# Patient Record
Sex: FEMALE | Race: WHITE | NOT HISPANIC OR LATINO | Employment: OTHER | URBAN - METROPOLITAN AREA
[De-identification: names, ages, dates, MRNs, and addresses within clinical notes are randomized per-mention and may not be internally consistent; named-entity substitution may affect disease eponyms.]

---

## 2024-08-29 ENCOUNTER — APPOINTMENT (OUTPATIENT)
Dept: RADIOLOGY | Facility: HOSPITAL | Age: 74
DRG: 375 | End: 2024-08-29
Payer: MEDICARE

## 2024-08-29 ENCOUNTER — APPOINTMENT (OUTPATIENT)
Dept: VASCULAR MEDICINE | Facility: HOSPITAL | Age: 74
DRG: 375 | End: 2024-08-29
Payer: MEDICARE

## 2024-08-29 ENCOUNTER — APPOINTMENT (OUTPATIENT)
Dept: CARDIOLOGY | Facility: HOSPITAL | Age: 74
DRG: 375 | End: 2024-08-29
Payer: MEDICARE

## 2024-08-29 ENCOUNTER — HOSPITAL ENCOUNTER (INPATIENT)
Facility: HOSPITAL | Age: 74
DRG: 375 | End: 2024-08-29
Attending: EMERGENCY MEDICINE | Admitting: INTERNAL MEDICINE
Payer: MEDICARE

## 2024-08-29 DIAGNOSIS — R60.0 LOCALIZED EDEMA: ICD-10-CM

## 2024-08-29 DIAGNOSIS — E87.1 HYPONATREMIA: Primary | ICD-10-CM

## 2024-08-29 DIAGNOSIS — R52 PAIN: ICD-10-CM

## 2024-08-29 DIAGNOSIS — C18.9 MALIGNANT NEOPLASM OF COLON, UNSPECIFIED PART OF COLON (MULTI): ICD-10-CM

## 2024-08-29 LAB
ALBUMIN SERPL BCP-MCNC: 3 G/DL (ref 3.4–5)
ALP SERPL-CCNC: 92 U/L (ref 33–136)
ALT SERPL W P-5'-P-CCNC: 3 U/L (ref 7–45)
ANION GAP SERPL CALC-SCNC: 14 MMOL/L (ref 10–20)
AST SERPL W P-5'-P-CCNC: 8 U/L (ref 9–39)
BASOPHILS # BLD AUTO: 0.01 X10*3/UL (ref 0–0.1)
BASOPHILS NFR BLD AUTO: 0.1 %
BILIRUB SERPL-MCNC: 0.8 MG/DL (ref 0–1.2)
BUN SERPL-MCNC: 19 MG/DL (ref 6–23)
CALCIUM SERPL-MCNC: 9.1 MG/DL (ref 8.6–10.3)
CHLORIDE SERPL-SCNC: 93 MMOL/L (ref 98–107)
CO2 SERPL-SCNC: 25 MMOL/L (ref 21–32)
CREAT SERPL-MCNC: 0.95 MG/DL (ref 0.5–1.05)
EGFRCR SERPLBLD CKD-EPI 2021: 63 ML/MIN/1.73M*2
EOSINOPHIL # BLD AUTO: 0.27 X10*3/UL (ref 0–0.4)
EOSINOPHIL NFR BLD AUTO: 2.6 %
ERYTHROCYTE [DISTWIDTH] IN BLOOD BY AUTOMATED COUNT: 13.9 % (ref 11.5–14.5)
GLUCOSE BLD MANUAL STRIP-MCNC: 130 MG/DL (ref 74–99)
GLUCOSE BLD MANUAL STRIP-MCNC: 165 MG/DL (ref 74–99)
GLUCOSE SERPL-MCNC: 184 MG/DL (ref 74–99)
HCT VFR BLD AUTO: 30.4 % (ref 36–46)
HGB BLD-MCNC: 9.6 G/DL (ref 12–16)
IMM GRANULOCYTES # BLD AUTO: 0.05 X10*3/UL (ref 0–0.5)
IMM GRANULOCYTES NFR BLD AUTO: 0.5 % (ref 0–0.9)
LACTATE SERPL-SCNC: 1.2 MMOL/L (ref 0.4–2)
LYMPHOCYTES # BLD AUTO: 0.71 X10*3/UL (ref 0.8–3)
LYMPHOCYTES NFR BLD AUTO: 7 %
MAGNESIUM SERPL-MCNC: 1.39 MG/DL (ref 1.6–2.4)
MCH RBC QN AUTO: 27.3 PG (ref 26–34)
MCHC RBC AUTO-ENTMCNC: 31.6 G/DL (ref 32–36)
MCV RBC AUTO: 86 FL (ref 80–100)
MONOCYTES # BLD AUTO: 0.56 X10*3/UL (ref 0.05–0.8)
MONOCYTES NFR BLD AUTO: 5.5 %
NEUTROPHILS # BLD AUTO: 8.59 X10*3/UL (ref 1.6–5.5)
NEUTROPHILS NFR BLD AUTO: 84.3 %
NRBC BLD-RTO: 0 /100 WBCS (ref 0–0)
PLATELET # BLD AUTO: 194 X10*3/UL (ref 150–450)
POTASSIUM SERPL-SCNC: 4.1 MMOL/L (ref 3.5–5.3)
PROT SERPL-MCNC: 6.3 G/DL (ref 6.4–8.2)
RBC # BLD AUTO: 3.52 X10*6/UL (ref 4–5.2)
SARS-COV-2 RNA RESP QL NAA+PROBE: NOT DETECTED
SODIUM SERPL-SCNC: 128 MMOL/L (ref 136–145)
WBC # BLD AUTO: 10.2 X10*3/UL (ref 4.4–11.3)

## 2024-08-29 PROCEDURE — 2500000004 HC RX 250 GENERAL PHARMACY W/ HCPCS (ALT 636 FOR OP/ED)

## 2024-08-29 PROCEDURE — 71046 X-RAY EXAM CHEST 2 VIEWS: CPT

## 2024-08-29 PROCEDURE — 96361 HYDRATE IV INFUSION ADD-ON: CPT

## 2024-08-29 PROCEDURE — 74176 CT ABD & PELVIS W/O CONTRAST: CPT

## 2024-08-29 PROCEDURE — 83605 ASSAY OF LACTIC ACID: CPT

## 2024-08-29 PROCEDURE — 71046 X-RAY EXAM CHEST 2 VIEWS: CPT | Mod: FOREIGN READ | Performed by: RADIOLOGY

## 2024-08-29 PROCEDURE — 96366 THER/PROPH/DIAG IV INF ADDON: CPT

## 2024-08-29 PROCEDURE — 70450 CT HEAD/BRAIN W/O DYE: CPT

## 2024-08-29 PROCEDURE — 83735 ASSAY OF MAGNESIUM: CPT

## 2024-08-29 PROCEDURE — 99285 EMERGENCY DEPT VISIT HI MDM: CPT | Mod: 25

## 2024-08-29 PROCEDURE — 36415 COLL VENOUS BLD VENIPUNCTURE: CPT

## 2024-08-29 PROCEDURE — 82947 ASSAY GLUCOSE BLOOD QUANT: CPT

## 2024-08-29 PROCEDURE — 93005 ELECTROCARDIOGRAM TRACING: CPT

## 2024-08-29 PROCEDURE — 1100000001 HC PRIVATE ROOM DAILY

## 2024-08-29 PROCEDURE — 85025 COMPLETE CBC W/AUTO DIFF WBC: CPT

## 2024-08-29 PROCEDURE — 70450 CT HEAD/BRAIN W/O DYE: CPT | Performed by: RADIOLOGY

## 2024-08-29 PROCEDURE — 93970 EXTREMITY STUDY: CPT

## 2024-08-29 PROCEDURE — 71250 CT THORAX DX C-: CPT | Mod: FOREIGN READ | Performed by: RADIOLOGY

## 2024-08-29 PROCEDURE — 87635 SARS-COV-2 COVID-19 AMP PRB: CPT

## 2024-08-29 PROCEDURE — 74176 CT ABD & PELVIS W/O CONTRAST: CPT | Mod: FOREIGN READ | Performed by: RADIOLOGY

## 2024-08-29 PROCEDURE — 80053 COMPREHEN METABOLIC PANEL: CPT

## 2024-08-29 PROCEDURE — 93970 EXTREMITY STUDY: CPT | Performed by: INTERNAL MEDICINE

## 2024-08-29 PROCEDURE — 96365 THER/PROPH/DIAG IV INF INIT: CPT | Mod: 59

## 2024-08-29 RX ORDER — ACETAMINOPHEN 325 MG/1
650 TABLET ORAL EVERY 4 HOURS PRN
Status: DISCONTINUED | OUTPATIENT
Start: 2024-08-29 | End: 2024-09-04 | Stop reason: HOSPADM

## 2024-08-29 RX ORDER — SODIUM CHLORIDE 9 MG/ML
100 INJECTION, SOLUTION INTRAVENOUS CONTINUOUS
Status: ACTIVE | OUTPATIENT
Start: 2024-08-29 | End: 2024-08-30

## 2024-08-29 RX ORDER — DEXTROSE 50 % IN WATER (D50W) INTRAVENOUS SYRINGE
12.5
Status: DISCONTINUED | OUTPATIENT
Start: 2024-08-29 | End: 2024-09-04 | Stop reason: HOSPADM

## 2024-08-29 RX ORDER — MORPHINE SULFATE 2 MG/ML
2 INJECTION, SOLUTION INTRAMUSCULAR; INTRAVENOUS EVERY 4 HOURS PRN
Status: DISCONTINUED | OUTPATIENT
Start: 2024-08-29 | End: 2024-09-04 | Stop reason: HOSPADM

## 2024-08-29 RX ORDER — DEXTROSE 50 % IN WATER (D50W) INTRAVENOUS SYRINGE
25
Status: DISCONTINUED | OUTPATIENT
Start: 2024-08-29 | End: 2024-09-04 | Stop reason: HOSPADM

## 2024-08-29 RX ORDER — MAGNESIUM SULFATE HEPTAHYDRATE 40 MG/ML
2 INJECTION, SOLUTION INTRAVENOUS ONCE
Status: COMPLETED | OUTPATIENT
Start: 2024-08-29 | End: 2024-08-29

## 2024-08-29 RX ORDER — INSULIN LISPRO 100 [IU]/ML
0-5 INJECTION, SOLUTION INTRAVENOUS; SUBCUTANEOUS
Status: DISCONTINUED | OUTPATIENT
Start: 2024-08-29 | End: 2024-09-04 | Stop reason: HOSPADM

## 2024-08-29 RX ORDER — ENOXAPARIN SODIUM 100 MG/ML
40 INJECTION SUBCUTANEOUS EVERY 24 HOURS
Status: DISCONTINUED | OUTPATIENT
Start: 2024-08-29 | End: 2024-09-04 | Stop reason: HOSPADM

## 2024-08-29 SDOH — SOCIAL STABILITY: SOCIAL INSECURITY: WITHIN THE LAST YEAR, HAVE YOU BEEN HUMILIATED OR EMOTIONALLY ABUSED IN OTHER WAYS BY YOUR PARTNER OR EX-PARTNER?: NO

## 2024-08-29 SDOH — ECONOMIC STABILITY: FOOD INSECURITY: WITHIN THE PAST 12 MONTHS, YOU WORRIED THAT YOUR FOOD WOULD RUN OUT BEFORE YOU GOT MONEY TO BUY MORE.: NEVER TRUE

## 2024-08-29 SDOH — SOCIAL STABILITY: SOCIAL INSECURITY: ARE THERE ANY APPARENT SIGNS OF INJURIES/BEHAVIORS THAT COULD BE RELATED TO ABUSE/NEGLECT?: NO

## 2024-08-29 SDOH — SOCIAL STABILITY: SOCIAL INSECURITY: HAVE YOU HAD ANY THOUGHTS OF HARMING ANYONE ELSE?: NO

## 2024-08-29 SDOH — ECONOMIC STABILITY: INCOME INSECURITY: IN THE PAST 12 MONTHS, HAS THE ELECTRIC, GAS, OIL, OR WATER COMPANY THREATENED TO SHUT OFF SERVICE IN YOUR HOME?: NO

## 2024-08-29 SDOH — SOCIAL STABILITY: SOCIAL INSECURITY
WITHIN THE LAST YEAR, HAVE YOU BEEN KICKED, HIT, SLAPPED, OR OTHERWISE PHYSICALLY HURT BY YOUR PARTNER OR EX-PARTNER?: NO

## 2024-08-29 SDOH — SOCIAL STABILITY: SOCIAL INSECURITY: ABUSE: ADULT

## 2024-08-29 SDOH — SOCIAL STABILITY: SOCIAL INSECURITY: HAS ANYONE EVER THREATENED TO HURT YOUR FAMILY OR YOUR PETS?: NO

## 2024-08-29 SDOH — SOCIAL STABILITY: SOCIAL INSECURITY
WITHIN THE LAST YEAR, HAVE TO BEEN RAPED OR FORCED TO HAVE ANY KIND OF SEXUAL ACTIVITY BY YOUR PARTNER OR EX-PARTNER?: NO

## 2024-08-29 SDOH — SOCIAL STABILITY: SOCIAL INSECURITY: DOES ANYONE TRY TO KEEP YOU FROM HAVING/CONTACTING OTHER FRIENDS OR DOING THINGS OUTSIDE YOUR HOME?: NO

## 2024-08-29 SDOH — SOCIAL STABILITY: SOCIAL NETWORK: HOW OFTEN DO YOU GET TOGETHER WITH FRIENDS OR RELATIVES?: ONCE A WEEK

## 2024-08-29 SDOH — ECONOMIC STABILITY: FOOD INSECURITY: WITHIN THE PAST 12 MONTHS, THE FOOD YOU BOUGHT JUST DIDN'T LAST AND YOU DIDN'T HAVE MONEY TO GET MORE.: NEVER TRUE

## 2024-08-29 SDOH — HEALTH STABILITY: MENTAL HEALTH: EXPERIENCED ANY OF THE FOLLOWING LIFE EVENTS: DEATH OF FAMILY/FRIEND

## 2024-08-29 SDOH — SOCIAL STABILITY: SOCIAL NETWORK: IN A TYPICAL WEEK, HOW MANY TIMES DO YOU TALK ON THE PHONE WITH FAMILY, FRIENDS, OR NEIGHBORS?: ONCE A WEEK

## 2024-08-29 SDOH — SOCIAL STABILITY: SOCIAL NETWORK: HOW OFTEN DO YOU ATTENT MEETINGS OF THE CLUB OR ORGANIZATION YOU BELONG TO?: NEVER

## 2024-08-29 SDOH — SOCIAL STABILITY: SOCIAL NETWORK
DO YOU BELONG TO ANY CLUBS OR ORGANIZATIONS SUCH AS CHURCH GROUPS UNIONS, FRATERNAL OR ATHLETIC GROUPS, OR SCHOOL GROUPS?: NO

## 2024-08-29 SDOH — SOCIAL STABILITY: SOCIAL INSECURITY: DO YOU FEEL ANYONE HAS EXPLOITED OR TAKEN ADVANTAGE OF YOU FINANCIALLY OR OF YOUR PERSONAL PROPERTY?: NO

## 2024-08-29 SDOH — HEALTH STABILITY: MENTAL HEALTH
HOW OFTEN DO YOU NEED TO HAVE SOMEONE HELP YOU WHEN YOU READ INSTRUCTIONS, PAMPHLETS, OR OTHER WRITTEN MATERIAL FROM YOUR DOCTOR OR PHARMACY?: NEVER

## 2024-08-29 SDOH — SOCIAL STABILITY: SOCIAL INSECURITY: WERE YOU ABLE TO COMPLETE ALL THE BEHAVIORAL HEALTH SCREENINGS?: YES

## 2024-08-29 SDOH — SOCIAL STABILITY: SOCIAL INSECURITY: ARE YOU OR HAVE YOU BEEN THREATENED OR ABUSED PHYSICALLY, EMOTIONALLY, OR SEXUALLY BY ANYONE?: NO

## 2024-08-29 SDOH — SOCIAL STABILITY: SOCIAL INSECURITY: WITHIN THE LAST YEAR, HAVE YOU BEEN AFRAID OF YOUR PARTNER OR EX-PARTNER?: NO

## 2024-08-29 SDOH — SOCIAL STABILITY: SOCIAL INSECURITY: DO YOU FEEL UNSAFE GOING BACK TO THE PLACE WHERE YOU ARE LIVING?: NO

## 2024-08-29 SDOH — SOCIAL STABILITY: SOCIAL NETWORK: ARE YOU MARRIED, WIDOWED, DIVORCED, SEPARATED, NEVER MARRIED, OR LIVING WITH A PARTNER?: MARRIED

## 2024-08-29 SDOH — HEALTH STABILITY: PHYSICAL HEALTH: ON AVERAGE, HOW MANY DAYS PER WEEK DO YOU ENGAGE IN MODERATE TO STRENUOUS EXERCISE (LIKE A BRISK WALK)?: 0 DAYS

## 2024-08-29 SDOH — SOCIAL STABILITY: SOCIAL INSECURITY: HAVE YOU HAD THOUGHTS OF HARMING ANYONE ELSE?: NO

## 2024-08-29 SDOH — HEALTH STABILITY: PHYSICAL HEALTH: ON AVERAGE, HOW MANY MINUTES DO YOU ENGAGE IN EXERCISE AT THIS LEVEL?: 0 MIN

## 2024-08-29 SDOH — HEALTH STABILITY: MENTAL HEALTH
STRESS IS WHEN SOMEONE FEELS TENSE, NERVOUS, ANXIOUS, OR CAN'T SLEEP AT NIGHT BECAUSE THEIR MIND IS TROUBLED. HOW STRESSED ARE YOU?: ONLY A LITTLE

## 2024-08-29 SDOH — SOCIAL STABILITY: SOCIAL NETWORK: HOW OFTEN DO YOU ATTEND CHURCH OR RELIGIOUS SERVICES?: NEVER

## 2024-08-29 ASSESSMENT — PATIENT HEALTH QUESTIONNAIRE - PHQ9
SUM OF ALL RESPONSES TO PHQ9 QUESTIONS 1 & 2: 0
1. LITTLE INTEREST OR PLEASURE IN DOING THINGS: NOT AT ALL
2. FEELING DOWN, DEPRESSED OR HOPELESS: NOT AT ALL
2. FEELING DOWN, DEPRESSED OR HOPELESS: NOT AT ALL
SUM OF ALL RESPONSES TO PHQ9 QUESTIONS 1 & 2: 0
1. LITTLE INTEREST OR PLEASURE IN DOING THINGS: NOT AT ALL

## 2024-08-29 ASSESSMENT — ACTIVITIES OF DAILY LIVING (ADL)
DRESSING YOURSELF: INDEPENDENT
JUDGMENT_ADEQUATE_SAFELY_COMPLETE_DAILY_ACTIVITIES: NO
HEARING - RIGHT EAR: DIFFICULTY WITH NOISE
GROOMING: INDEPENDENT
LACK_OF_TRANSPORTATION: NO
FEEDING YOURSELF: INDEPENDENT
BATHING: INDEPENDENT
PATIENT'S MEMORY ADEQUATE TO SAFELY COMPLETE DAILY ACTIVITIES?: YES
HEARING - LEFT EAR: DIFFICULTY WITH NOISE
ADEQUATE_TO_COMPLETE_ADL: NO
TOILETING: NEEDS ASSISTANCE
WALKS IN HOME: UNABLE TO ASSESS

## 2024-08-29 ASSESSMENT — COLUMBIA-SUICIDE SEVERITY RATING SCALE - C-SSRS
2. HAVE YOU ACTUALLY HAD ANY THOUGHTS OF KILLING YOURSELF?: NO
1. IN THE PAST MONTH, HAVE YOU WISHED YOU WERE DEAD OR WISHED YOU COULD GO TO SLEEP AND NOT WAKE UP?: NO
6. HAVE YOU EVER DONE ANYTHING, STARTED TO DO ANYTHING, OR PREPARED TO DO ANYTHING TO END YOUR LIFE?: NO

## 2024-08-29 ASSESSMENT — PAIN - FUNCTIONAL ASSESSMENT: PAIN_FUNCTIONAL_ASSESSMENT: 0-10

## 2024-08-29 ASSESSMENT — LIFESTYLE VARIABLES
SUBSTANCE_ABUSE_PAST_12_MONTHS: NO
SUBSTANCE_ABUSE_PAST_12_MONTHS: NO
HOW OFTEN DO YOU HAVE A DRINK CONTAINING ALCOHOL: NEVER
SUBSTANCE_ABUSE_PAST_12_MONTHS: NO
HOW OFTEN DO YOU HAVE 6 OR MORE DRINKS ON ONE OCCASION: NEVER
AUDIT-C TOTAL SCORE: 0
HOW OFTEN DO YOU HAVE A DRINK CONTAINING ALCOHOL: NEVER
AUDIT-C TOTAL SCORE: 0
PRESCIPTION_ABUSE_PAST_12_MONTHS: NO
HOW OFTEN DO YOU HAVE 6 OR MORE DRINKS ON ONE OCCASION: NEVER
PRESCIPTION_ABUSE_PAST_12_MONTHS: NO
SKIP TO QUESTIONS 9-10: 1
AUDIT-C TOTAL SCORE: 0
PRESCIPTION_ABUSE_PAST_12_MONTHS: NO
SKIP TO QUESTIONS 9-10: 1
AUDIT-C TOTAL SCORE: 0
HOW MANY STANDARD DRINKS CONTAINING ALCOHOL DO YOU HAVE ON A TYPICAL DAY: PATIENT DOES NOT DRINK
HOW MANY STANDARD DRINKS CONTAINING ALCOHOL DO YOU HAVE ON A TYPICAL DAY: PATIENT DOES NOT DRINK

## 2024-08-29 ASSESSMENT — PAIN SCALES - GENERAL: PAINLEVEL_OUTOF10: 0 - NO PAIN

## 2024-08-29 ASSESSMENT — COGNITIVE AND FUNCTIONAL STATUS - GENERAL: PATIENT BASELINE BEDBOUND: YES

## 2024-08-29 NOTE — PROGRESS NOTES
Pharmacy Medication History Review    Mehnaz Nunez is a 74 y.o. female admitted for Hyponatremia. Pharmacy reviewed the patient's [id number] medications and allergies for accuracy.    The list below reflectives the updated PTA list. Please review each medication in order reconciliation for additional clarification and justification.  Prior to Admission medications    No Medications        The list below reflectives the updated allergy list. Please review each documented allergy for additional clarification and justification.  Allergies  Reviewed by Candice Roach RN on 8/29/2024   No Known Allergies         Below are additional concerns with the patient's PTA list.      Svetlana Escalera

## 2024-08-29 NOTE — ASSESSMENT & PLAN NOTE
History of colon cancer with metastasis to lungs  Generalized weakness  Decreased appetite  History of dementia  -Oncology and palliative care/hospice consult and recommendations appreciated [facility]  -Imaging results as above, CT head added on to imaging due to confusion, hyponatremia, possibility of metastasis to brain  -Pain medication as needed  -IV fluids  -Dietitian recommendations   -SW and TCC eval and treat, possible placement    Diarrhea  -Pt  and best friend at bedside report patient has been having diarrhea x 1 week and fever yesterday, does endorse recent antibiotic use  -Stool pathogen and C diff PCR ordered    Hyponatremia  Hypochloremia  Hypomagnesemia  -IV fluids, patient's  endorses poor oral intake by patient x 3 days- likely due to dehydration  -Magnesium replaced in the ED  -Monitor with BMP in the a.m.    Anemia  -Hemoglobin 9.6 today (7.8 on 10/5/2021)  -Patient denies any signs of overt bleeding. Monitor for signs of bleeding, monitor with CBC in a.m.    Hypoalbuminemia  -Dietitian consult and recommendations appreciated  -Wound prevention    Hypertension  Hyperlipidemia  T2DM  -SSI  -Hypoglycemic protocol  -Diabetic diet    DVT prophylaxis  -Lovenox  -SCDs   160.02

## 2024-08-29 NOTE — H&P
History Of Present Illness  Mehnaz Nunez is a 74 y.o. female with a past medical history of hypertension, hyperlipidemia, T2DM, colon cancer (dx 3/21) with metastasis to lungs s/p chemo (reported last chemo in August of 2023), and dementia who presents to the emergency department for weakness.   and best friend at the bedside assisting with history.  Patient can tell me her name and where she is, but she does not know what month it is.  She does not understand why she is in the emergency department, and repeatedly states she would like to go home.  He states she has been having a decreased appetite for the past 3 days, accompanied by decreased movement and weakness.  He states she has had no food or water, has become so weak that she cannot stand.  She uses a walker usually to get around, but has not been able to use it lately.  Her best friend at the bedside is concerned because patient's  is unable to lift the patient.  They also report the patient has not been talking much.  He endorses that patient has had diarrhea (mixture of soft stools and liquid stools) for the past 1 week, and states she had a fever yesterday of 100.2 degrees.  He does state that she was recently on an antibiotic for suspected bronchitis a couple weeks ago.  Also endorses a dry cough.  Patient otherwise denies chills, headache, dizziness, chest pain, shortness of breath, nausea/vomiting, urinary symptoms, numbness/tingling, slurred speech, unilateral weakness.  Has been states patient is not taking any medications at the moment.  Patient's best friend at the bedside states that patient is not following with oncologist anymore, is just following with Dr. Méndez.  I have a discussion with patient, patient's , patient's best friend regarding CODE STATUS.  They are all in agreement that patient is to be DNR CC.  Patient's best friend is requesting that she be also called with any updates.  Her phone number is  [phone].    ED course: On arrival, patient's blood pressure 113/58, heart rate 87, respirations 15, afebrile, saturating 99% on room air.  Labs and imaging performed, revealing sodium 128, glucose 184, magnesium 1.39, lactate 1.2, no leukocytosis, anemia with hemoglobin 9.6 (7.8 on 10/5/2021).  COVID-19 swab negative.  CXR shows multiple bilateral large lung masses/nodules noted, consistent with metastatic disease.  No pleural effusion or pneumothorax.  CT chest/abdomen/pelvis reveals moderate thickening and nodularity distal rectosigmoid colon, suggesting the primary tumor, moderate proximal stool burden.  Patient given LR bolus 1 L and magnesium replacement in the ED.  Urinalysis pending.  Patient to be admitted under Dr. Cuevas.     Past Medical History  Past Medical History:   Diagnosis Date    Abnormal findings on diagnostic imaging of other specified body structures     Abnormal CT scan    Personal history of malignant neoplasm, unspecified     History of malignant neoplasm       Surgical History  Past Surgical History:   Procedure Laterality Date    OTHER SURGICAL HISTORY  02/25/2021    Incisional biopsy of breast    OTHER SURGICAL HISTORY  02/25/2021    Tonsillectomy    OTHER SURGICAL HISTORY  02/25/2021    Hysterectomy        Social History  She has no history on file for tobacco use, alcohol use, and drug use.    Family History  No family history on file.     Allergies  Patient has no known allergies.    Review of Systems     Physical Exam  Constitutional:       General: She is not in acute distress.     Appearance: She is not ill-appearing or toxic-appearing.      Comments: Patient is able to tell me her name and where she is, but she does not know what month it is.  She appears confused, does not know why she is in the emergency department.  She asked to go home repeatedly.   HENT:      Head: Normocephalic and atraumatic.      Nose: Nose normal.      Mouth/Throat:      Mouth: Mucous membranes are dry.    Eyes:      Extraocular Movements: Extraocular movements intact.      Conjunctiva/sclera: Conjunctivae normal.      Pupils: Pupils are equal, round, and reactive to light.   Cardiovascular:      Rate and Rhythm: Normal rate and regular rhythm.      Comments: Weak pulses in BLE.  Pulmonary:      Effort: Pulmonary effort is normal.      Comments: Coarse breath sounds  Abdominal:      General: Abdomen is flat.      Palpations: Abdomen is soft.      Tenderness: There is no abdominal tenderness.   Musculoskeletal:         General: Normal range of motion.      Cervical back: Normal range of motion.      Right lower leg: Edema (1+ pitting pedal edema) present.      Left lower leg: Edema (1+ pitting edema) present.   Skin:     General: Skin is warm and dry.      Coloration: Skin is not jaundiced.      Findings: No erythema.   Neurological:      Mental Status: She is disoriented.      Motor: Weakness (5/5 UE strength. 5/5 L sided LE strength, 2/5 R sided LE strength) present.          Last Recorded Vitals  Blood pressure 85/52, pulse 72, temperature 36.9 °C (98.4 °F), resp. rate (!) 24, SpO2 96%.    Relevant Results    Scheduled medications  enoxaparin, 40 mg, subcutaneous, q24h  insulin lispro, 0-5 Units, subcutaneous, TID      Continuous medications  sodium chloride 0.9%, 100 mL/hr      PRN medications  PRN medications: acetaminophen, morphine    Results for orders placed or performed during the hospital encounter of 08/29/24 (from the past 24 hour(s))   CBC and Auto Differential   Result Value Ref Range    WBC 10.2 4.4 - 11.3 x10*3/uL    nRBC 0.0 0.0 - 0.0 /100 WBCs    RBC 3.52 (L) 4.00 - 5.20 x10*6/uL    Hemoglobin 9.6 (L) 12.0 - 16.0 g/dL    Hematocrit 30.4 (L) 36.0 - 46.0 %    MCV 86 80 - 100 fL    MCH 27.3 26.0 - 34.0 pg    MCHC 31.6 (L) 32.0 - 36.0 g/dL    RDW 13.9 11.5 - 14.5 %    Platelets 194 150 - 450 x10*3/uL    Neutrophils % 84.3 40.0 - 80.0 %    Immature Granulocytes %, Automated 0.5 0.0 - 0.9 %     Lymphocytes % 7.0 13.0 - 44.0 %    Monocytes % 5.5 2.0 - 10.0 %    Eosinophils % 2.6 0.0 - 6.0 %    Basophils % 0.1 0.0 - 2.0 %    Neutrophils Absolute 8.59 (H) 1.60 - 5.50 x10*3/uL    Immature Granulocytes Absolute, Automated 0.05 0.00 - 0.50 x10*3/uL    Lymphocytes Absolute 0.71 (L) 0.80 - 3.00 x10*3/uL    Monocytes Absolute 0.56 0.05 - 0.80 x10*3/uL    Eosinophils Absolute 0.27 0.00 - 0.40 x10*3/uL    Basophils Absolute 0.01 0.00 - 0.10 x10*3/uL   Comprehensive metabolic panel   Result Value Ref Range    Glucose 184 (H) 74 - 99 mg/dL    Sodium 128 (L) 136 - 145 mmol/L    Potassium 4.1 3.5 - 5.3 mmol/L    Chloride 93 (L) 98 - 107 mmol/L    Bicarbonate 25 21 - 32 mmol/L    Anion Gap 14 10 - 20 mmol/L    Urea Nitrogen 19 6 - 23 mg/dL    Creatinine 0.95 0.50 - 1.05 mg/dL    eGFR 63 >60 mL/min/1.73m*2    Calcium 9.1 8.6 - 10.3 mg/dL    Albumin 3.0 (L) 3.4 - 5.0 g/dL    Alkaline Phosphatase 92 33 - 136 U/L    Total Protein 6.3 (L) 6.4 - 8.2 g/dL    AST 8 (L) 9 - 39 U/L    Bilirubin, Total 0.8 0.0 - 1.2 mg/dL    ALT 3 (L) 7 - 45 U/L   Magnesium   Result Value Ref Range    Magnesium 1.39 (L) 1.60 - 2.40 mg/dL   Lactate   Result Value Ref Range    Lactate 1.2 0.4 - 2.0 mmol/L   Sars-CoV-2 PCR   Result Value Ref Range    Coronavirus 2019, PCR Not Detected Not Detected     CT chest abdomen pelvis wo IV contrast    Result Date: 8/29/2024  STUDY: CT Chest, Abdomen, and Pelvis without IV Contrast; 8/29/2024 12:43 PM INDICATION: Increased mets to lungs, abdominal pain. COMPARISON: XR chest 8/29/2024. ACCESSION NUMBER(S): [id number] ORDERING CLINICIAN: SANJUANA HUERTAS TECHNIQUE: CT of the chest, abdomen, and pelvis was performed.  Contiguous axial images were obtained at 3 mm slice thickness through the chest, abdomen, and pelvis.  Coronal and sagittal reconstructions at 3 mm slice thickness were performed.  No intravenous contrast was administered.  FINDINGS: Please note that the evaluation of vessels, lymph nodes and organs  is limited without intravenous contrast. CHEST: There are numerous bilateral lung nodules compatible with diffuse metastases. At least 10-15 nodules are seen in each lung. Largest nodule in the inferior right upper lobe measures 5.0 x 4.0 cm (series 603B, slice 100/301). Some of the smaller nodules demonstrate central cavitation. There is associated trace pleural fluid. There is no pneumothorax. In the mediastinum, the heart is normal size with dense coronary artery calcifications. Thoracic aorta is normal caliber. There is no bulky lymphadenopathy. There is no pericardial effusion. ABDOMEN: No lesions are detectable in the liver. Spleen is minimally enlarged. The pancreas is fatty replaced. Adrenal glands are normal. Gallbladder is minimally distended with a small stone. Kidneys are normal size with minimal perinephric stranding and some tiny vascular calcifications. There is no hydronephrosis. There is no bulky abdominal or retroperitoneal lymphadenopathy. Abdominal aorta is normal caliber. There is no ascites. The stomach is decompressed. There is no small bowel thickening or obstruction. There is moderate proximal stool burden. PELVIS: There is moderate thickening and nodularity in the distal rectosigmoid colon, suggesting the patient's primary tumor. There is haziness and minimal fluid in the perirectal and presacral fat. Adjacent bladder is decompressed. Uterus is atrophic or absent. There are no significant hernias. BONES: There are no blastic or lytic lesions. There is advanced arthritis at the right shoulder. Mild spondylosis is seen throughout the spine. There is minimal compression deformity at T12. There is minimal degenerative arthritis and spondylolisthesis at L4-5.    1. Numerous large bilateral lung nodules compatible with diffuse metastases. 2. Moderate thickening and nodularity distal rectosigmoid colon, suggesting the primary tumor; moderate proximal stool burden. 3. No bowel obstruction, abscess,  or free air. 4. Remainder as above. Signed by Randy Hopper MD    XR chest 2 views    Result Date: 8/29/2024  STUDY: Chest Radiographs;  8/29/2024 9:25AM INDICATION: Cough, weakness. COMPARISON: 11/23/2020 XR Chest. ACCESSION NUMBER(S): [id number] ORDERING CLINICIAN: SANJUANA HUERTAS TECHNIQUE:  Frontal and lateral chest (three images). FINDINGS: CARDIOMEDIASTINAL SILHOUETTE: Cardiomediastinal silhouette is normal in size and configuration. Right IJ infusion catheter extends into the SVC.  LUNGS: Multiple bilateral large lung masses/nodules noted largest one measuring 5.2 cm within the right upper lobe consistent with metastatic disease.  No pleural effusion or pneumothorax.  ABDOMEN: No remarkable upper abdominal findings.  BONES: No acute osseous changes.    Bilateral lung masses consistent with metastatic disease. Signed by Gaudencio Bates MD        Assessment/Plan   Assessment & Plan  Hyponatremia  History of colon cancer with metastasis to lungs  Generalized weakness  Decreased appetite  History of dementia  -Oncology and palliative care/hospice consult and recommendations appreciated (Pt is DNRCC)  -Imaging results as above, CT head added on to imaging due to confusion, hyponatremia, possibility of metastasis to brain  -Pain medication as needed  -IV fluids  -Dietitian recommendations   -SW and TCC eval and treat, possible placement    Diarrhea  -Pt  and best friend at bedside report patient has been having diarrhea x 1 week and fever yesterday, does endorse recent antibiotic use  -Stool pathogen and C diff PCR ordered    Hyponatremia  Hypochloremia  Hypomagnesemia  -IV fluids, patient's  endorses poor oral intake by patient x 3 days- likely due to dehydration  -Magnesium replaced in the ED  -Monitor with BMP in the a.m.    Anemia  -Hemoglobin 9.6 today (7.8 on 10/5/2021)  -Patient denies any signs of overt bleeding. Monitor for signs of bleeding, monitor with CBC in  a.m.    Hypoalbuminemia  -Dietitian consult and recommendations appreciated  -Wound prevention    Hypertension  Hyperlipidemia  T2DM  -SSI  -Hypoglycemic protocol  -Diabetic diet    DVT prophylaxis  -Lovenox  -SCDs         I spent 75 minutes in the professional and overall care of this patient.      Anabelle Heard PA-C

## 2024-08-29 NOTE — ED PROVIDER NOTES
History of Present Illness     History provided by: Patient and Significant Other  Limitations to History: Altered Mental Status  External Records Reviewed with Brief Summary: None    HPI:  Mehnaz Nunez is a 74 y.o. female past medical history of hypertension, hyperlipidemia, type 2 diabetes, lung cancer with metastasis status post chemo, dementia who presents emergency room for weakness.  Patient in the room and states that she feels completely fine and does not understand why she is here in the emergency room.  Significant other states that she has been having a decreased appetite for the past 3 days, decreased movement and weakness for the past 2 days.  Patient's  also states that she is also not been talking very much.  Spouse states that he had a temperature of 100.2.  States that she has had a chronic cough that is dry that brings nonproductive sputum.  About a month ago she was given prescription of amoxicillin and prednisone for possible bronchitis on 8/4.  Denies chest pain, shortness of breath, abdominal pain, nausea, vomiting, dysuria, hematuria.  Patient has a history of dementia and she is alert and oriented x 2.  Unsure what her baseline is given that spouse does not know himself.  States that she normally does not talk very much.    Physical Exam   Triage vitals:  T 36.9 °C (98.4 °F)  HR 87  /58  RR 15  O2 99 % None (Room air)    Physical Exam  Constitutional:       Appearance: Normal appearance. She is normal weight.   HENT:      Head: Normocephalic and atraumatic.      Right Ear: External ear normal.      Left Ear: External ear normal.      Nose: Nose normal.      Mouth/Throat:      Mouth: Mucous membranes are moist.      Pharynx: Oropharynx is clear.   Eyes:      Extraocular Movements: Extraocular movements intact.      Conjunctiva/sclera: Conjunctivae normal.      Pupils: Pupils are equal, round, and reactive to light.   Cardiovascular:      Rate and Rhythm: Normal rate and  regular rhythm.   Pulmonary:      Effort: Pulmonary effort is normal. No respiratory distress.      Breath sounds: Normal breath sounds. No stridor. No wheezing, rhonchi or rales.   Abdominal:      General: Abdomen is flat. There is no distension.      Palpations: Abdomen is soft.      Tenderness: There is no abdominal tenderness. There is no guarding or rebound.   Musculoskeletal:      Cervical back: Normal range of motion and neck supple.      Comments: 5/5 UE strength   2/5 LE strength   Skin:     General: Skin is warm.      Capillary Refill: Capillary refill takes less than 2 seconds.   Neurological:      Mental Status: She is alert.      Motor: Weakness present.      Comments: AXO1   Psychiatric:         Mood and Affect: Mood normal.      Comments: Patient is tearful on examination patient does not understand why she is here in the emergency room.          Medical Decision Making & ED Course   Medical Decision Makin y.o. female with past medical history of hypertension, hyperlipidemia, type 2 diabetes, lung cancer with metastasis status post chemo, dementia who presents emergency room for weakness.  Differential diagnosis includes sepsis with unknown origin, urinary tract infection, COVID, pneumonia, electrolyte abnormalities, advancement of metastatic carcinoma, severe dehydration secondary to failure to thrive. On arrival to the emergency room, patient is vitally stable blood pressure of 113/58, heart rate of 87 and satting 98% on room air.  CBC shows a normocytic anemia similar to baseline.  CMP shows a hyponatremia of 128 the hyperglycemia of 184 likely due to hypovolemia.  Patient was given 1 L of LR.  Magnesium was 1.39 and patient was replenished magnesium.  Lactate was within normal limits.  EKG shows no ischemic changes.  COVID was negative.  Chest x-ray shows bilateral lung masses consistent with metastatic carcinoma.  The chest ray shows no opacities or consolidations, patient is afebrile and  without leukocytosis, this is less likely a pneumonia.  Chest x-ray also shows no evidence of pneumothorax.  CT chest abdomen pelvis with IV contrast shows similar numerous large bilateral lung nodules consistent with diffuse metastasis, moderate thickening and nodularity of the distal rectosigmoid colon suggesting primary tumor.  No bowel obstruction, abscess or free air is visualized on the rest of the exam.  Given that we do not have access to previous imaging at [facility], there is no way to determine whether the cancer has increased in size.  Per spouse, patient stopped chemotherapy about a year ago.  Urinalysis has been ordered however patient has not yet been able to get a urine sample done here in the emergency room.  We spoke to a friend at bedside, who also corroborates information that spouse is told and additionally states that spouse is not able to carry her and having difficulty taking care of her by himself.  Given the patient's weakness along with inability to take care of herself, patient has been admitted under general medicine for further workup.  ----         Social Determinants of Health which Significantly Impact Care: None identified The following actions were taken to address these social determinants: None    EKG Independent Interpretation: EKG interpreted by myself. Please see ED Course for full interpretation.    Independent Result Review and Interpretation: Relevant laboratory and radiographic results were reviewed and independently interpreted by myself.  As necessary, they are commented on in the ED Course.    Chronic conditions affecting the patient's care: As documented above in [facility]    The patient was discussed with the following consultants/services: None    Care Considerations: As documented above in [facility]    ED Course:  ED Course as of 08/30/24 0704   u Aug 29, 2024   1040 EKG shows sinus tachycardia with 1st degree AV block with PVCs, heart rate of 117, VA of 352, QRS of  762, , no ST elevations, depressions, no T wave inversions.  [YG]   1406 CT chest abdomen pelvis shows 1. Numerous large bilateral lung nodules compatible with diffuse  metastases.  2. Moderate thickening and nodularity distal rectosigmoid colon,  suggesting the primary tumor; moderate proximal stool burden.  3. No bowel obstruction, abscess, or free air.   [YG]      ED Course User Index  [YG] Miley Hinton MD         Diagnoses as of 08/30/24 0704   Hyponatremia     Disposition   As a result of their workup, the patient will require admission to the hospital.  The patient was informed of her diagnosis.  The patient was given the opportunity to ask questions and I answered them. The patient agreed to be admitted to the hospital.    Procedures   Procedures    Patient seen and discussed with ED attending physician.    Miley Hinton MD  Emergency Medicine     Miley Hinton MD  Resident  08/30/24 0707

## 2024-08-30 LAB
ANION GAP SERPL CALC-SCNC: 11 MMOL/L (ref 10–20)
APPEARANCE UR: ABNORMAL
ATRIAL RATE: 92 BPM
BACTERIA #/AREA URNS AUTO: ABNORMAL /HPF
BILIRUB UR STRIP.AUTO-MCNC: NEGATIVE MG/DL
BUN SERPL-MCNC: 19 MG/DL (ref 6–23)
C COLI+JEJ+UPSA DNA STL QL NAA+PROBE: NOT DETECTED
C DIF TOX TCDA+TCDB STL QL NAA+PROBE: NOT DETECTED
CALCIUM SERPL-MCNC: 8.3 MG/DL (ref 8.6–10.3)
CAOX CRY #/AREA UR COMP ASSIST: ABNORMAL /HPF
CHLORIDE SERPL-SCNC: 99 MMOL/L (ref 98–107)
CO2 SERPL-SCNC: 25 MMOL/L (ref 21–32)
COLOR UR: ABNORMAL
CREAT SERPL-MCNC: 0.96 MG/DL (ref 0.5–1.05)
EC STX1 GENE STL QL NAA+PROBE: NOT DETECTED
EC STX2 GENE STL QL NAA+PROBE: NOT DETECTED
EGFRCR SERPLBLD CKD-EPI 2021: 62 ML/MIN/1.73M*2
ERYTHROCYTE [DISTWIDTH] IN BLOOD BY AUTOMATED COUNT: 14.1 % (ref 11.5–14.5)
GLUCOSE BLD MANUAL STRIP-MCNC: 127 MG/DL (ref 74–99)
GLUCOSE BLD MANUAL STRIP-MCNC: 146 MG/DL (ref 74–99)
GLUCOSE BLD MANUAL STRIP-MCNC: 150 MG/DL (ref 74–99)
GLUCOSE BLD MANUAL STRIP-MCNC: 183 MG/DL (ref 74–99)
GLUCOSE BLD MANUAL STRIP-MCNC: 218 MG/DL (ref 74–99)
GLUCOSE SERPL-MCNC: 133 MG/DL (ref 74–99)
GLUCOSE UR STRIP.AUTO-MCNC: ABNORMAL MG/DL
HCT VFR BLD AUTO: 27 % (ref 36–46)
HGB BLD-MCNC: 8.4 G/DL (ref 12–16)
HOLD SPECIMEN: NORMAL
HOLD SPECIMEN: NORMAL
KETONES UR STRIP.AUTO-MCNC: NEGATIVE MG/DL
LEUKOCYTE ESTERASE UR QL STRIP.AUTO: ABNORMAL
MAGNESIUM SERPL-MCNC: 1.66 MG/DL (ref 1.6–2.4)
MCH RBC QN AUTO: 26.9 PG (ref 26–34)
MCHC RBC AUTO-ENTMCNC: 31.1 G/DL (ref 32–36)
MCV RBC AUTO: 87 FL (ref 80–100)
MUCOUS THREADS #/AREA URNS AUTO: ABNORMAL /LPF
NITRITE UR QL STRIP.AUTO: NEGATIVE
NOROVIRUS GI + GII RNA STL NAA+PROBE: NOT DETECTED
NRBC BLD-RTO: 0 /100 WBCS (ref 0–0)
PH UR STRIP.AUTO: 6.5 [PH]
PLATELET # BLD AUTO: 202 X10*3/UL (ref 150–450)
POTASSIUM SERPL-SCNC: 4.1 MMOL/L (ref 3.5–5.3)
PR INTERVAL: 352 MS
PROT UR STRIP.AUTO-MCNC: ABNORMAL MG/DL
Q ONSET: 220 MS
QRS COUNT: 20 BEATS
QRS DURATION: 62 MS
QT INTERVAL: 320 MS
QTC CALCULATION(BAZETT): 446 MS
QTC FREDERICIA: 400 MS
R AXIS: -34 DEGREES
RBC # BLD AUTO: 3.12 X10*6/UL (ref 4–5.2)
RBC # UR STRIP.AUTO: ABNORMAL /UL
RBC #/AREA URNS AUTO: ABNORMAL /HPF
RV RNA STL NAA+PROBE: NOT DETECTED
SALMONELLA DNA STL QL NAA+PROBE: NOT DETECTED
SHIGELLA DNA SPEC QL NAA+PROBE: NOT DETECTED
SODIUM SERPL-SCNC: 131 MMOL/L (ref 136–145)
SP GR UR STRIP.AUTO: 1.02
T AXIS: 31 DEGREES
T OFFSET: 380 MS
UROBILINOGEN UR STRIP.AUTO-MCNC: NORMAL MG/DL
V CHOLERAE DNA STL QL NAA+PROBE: NOT DETECTED
VENTRICULAR RATE: 117 BPM
WBC # BLD AUTO: 7.4 X10*3/UL (ref 4.4–11.3)
WBC #/AREA URNS AUTO: ABNORMAL /HPF
WBC CLUMPS #/AREA URNS AUTO: ABNORMAL /HPF
Y ENTEROCOL DNA STL QL NAA+PROBE: NOT DETECTED

## 2024-08-30 PROCEDURE — 85027 COMPLETE CBC AUTOMATED: CPT

## 2024-08-30 PROCEDURE — 87086 URINE CULTURE/COLONY COUNT: CPT | Mod: PARLAB

## 2024-08-30 PROCEDURE — 87506 IADNA-DNA/RNA PROBE TQ 6-11: CPT | Mod: PARLAB

## 2024-08-30 PROCEDURE — 82947 ASSAY GLUCOSE BLOOD QUANT: CPT

## 2024-08-30 PROCEDURE — 80048 BASIC METABOLIC PNL TOTAL CA: CPT

## 2024-08-30 PROCEDURE — 81001 URINALYSIS AUTO W/SCOPE: CPT

## 2024-08-30 PROCEDURE — 2500000004 HC RX 250 GENERAL PHARMACY W/ HCPCS (ALT 636 FOR OP/ED)

## 2024-08-30 PROCEDURE — 1100000001 HC PRIVATE ROOM DAILY

## 2024-08-30 PROCEDURE — 2500000002 HC RX 250 W HCPCS SELF ADMINISTERED DRUGS (ALT 637 FOR MEDICARE OP, ALT 636 FOR OP/ED)

## 2024-08-30 PROCEDURE — 99222 1ST HOSP IP/OBS MODERATE 55: CPT

## 2024-08-30 PROCEDURE — 87493 C DIFF AMPLIFIED PROBE: CPT | Mod: 59,PARLAB

## 2024-08-30 PROCEDURE — 83735 ASSAY OF MAGNESIUM: CPT

## 2024-08-30 PROCEDURE — 36415 COLL VENOUS BLD VENIPUNCTURE: CPT

## 2024-08-30 ASSESSMENT — PAIN SCALES - GENERAL
PAINLEVEL_OUTOF10: 0 - NO PAIN
PAINLEVEL_OUTOF10: 0 - NO PAIN

## 2024-08-30 ASSESSMENT — PAIN - FUNCTIONAL ASSESSMENT: PAIN_FUNCTIONAL_ASSESSMENT: 0-10

## 2024-08-30 NOTE — H&P
History Of Present Illness/patient seen and examined by me.  History obtained from the emergency room notes and NP's history physical.  Patient is awake alert oriented x 2.  Patient is a poor historian.  Patient not able to give any information.  Mehnaz Nunez is a 74 y.o. female with a past medical history of hypertension, hyperlipidemia, T2DM, colon cancer (dx 3/21) with metastasis to lungs s/p chemo (reported last chemo in August of 2023), and dementia who is being taken care of by her  Aidan and a family friend Jaci who was brought to the ED for further evaluation of her failure to thrive/generalized weakness and deconditioning and not eating for the past 3 to 4 days.   and best friend at the bedside assisting with history.  Patient can tell me her name and where she is, but she does not know what month it is.  She does not understand why she is in the emergency department, and repeatedly states she would like to go home.  As per  she has been having a decreased appetite for the past 3 days, accompanied by decreased movement and weakness.  He states she has had no food or water, has become so weak that she cannot stand.  She uses a walker usually to get around, but has not been able to use it lately.   Patient's  was unable to lift her or take care of her and was brought to the emergency room for further eval and treatment.  They also report the patient has not been talking much.  He endorses that patient has had diarrhea (mixture of soft stools and liquid stools) for the past 1 week, and states she had a fever yesterday of 100.2 degrees.  He does state that she was recently on an antibiotic for suspected bronchitis a couple weeks ago.  Also endorses a dry cough.  Patient otherwise denies chills, headache, dizziness, chest pain, shortness of breath, nausea/vomiting, urinary symptoms, numbness/tingling, slurred speech, unilateral weakness.  Has been states patient is not taking any  medications at the moment.  Patient's best friend at the bedside states that patient is not following with oncologist anymore, is just following with Dr. Méndez.  I have a discussion with patient, patient's , patient's best friend regarding CODE STATUS.  They are all in agreement that patient is to be DNR CC.  Patient's best friend is requesting that she be also called with any updates.  Her phone number is [phone].    ED course: On arrival, patient's blood pressure 113/58, heart rate 87, respirations 15, afebrile, saturating 99% on room air.  Labs and imaging performed, revealing sodium 128, glucose 184, magnesium 1.39, lactate 1.2, no leukocytosis, anemia with hemoglobin 9.6 (7.8 on 10/5/2021).  COVID-19 swab negative.  CXR shows multiple bilateral large lung masses/nodules noted, consistent with metastatic disease.  No pleural effusion or pneumothorax.  CT chest/abdomen/pelvis reveals moderate thickening and nodularity distal rectosigmoid colon, suggesting the primary tumor, moderate proximal stool burden.  Patient given LR bolus 1 L and magnesium replacement in the ED.  Urinalysis pending.  PT was admitted under my service to regular medical floor for further medical evaluation and treatment.     Past Medical History  Past Medical History:   Diagnosis Date    Abnormal findings on diagnostic imaging of other specified body structures     Abnormal CT scan    Personal history of malignant neoplasm, unspecified     History of malignant neoplasm       Surgical History  Past Surgical History:   Procedure Laterality Date    OTHER SURGICAL HISTORY  02/25/2021    Incisional biopsy of breast    OTHER SURGICAL HISTORY  02/25/2021    Tonsillectomy    OTHER SURGICAL HISTORY  02/25/2021    Hysterectomy        Social History  She reports that she has never smoked. She has never used smokeless tobacco. She reports that she does not drink alcohol. No history on file for drug use.    Family History  No family history  on file.     Allergies  Patient has no known allergies.    Review of Systems     Physical Exam  Constitutional:       General: She is not in acute distress.     Appearance: She is not ill-appearing or toxic-appearing.      Comments: Patient is able to tell me her name and where she is, but she does not know what month it is.  She appears confused, does not know why she is in the emergency department.  She asked to go home repeatedly.   HENT:      Head: Normocephalic and atraumatic.      Nose: Nose normal.      Mouth/Throat:      Mouth: Mucous membranes are dry.   Eyes:      Extraocular Movements: Extraocular movements intact.      Conjunctiva/sclera: Conjunctivae normal.      Pupils: Pupils are equal, round, and reactive to light.   Cardiovascular:      Rate and Rhythm: Normal rate and regular rhythm.      Comments: Weak pulses in BLE.  Pulmonary:      Effort: Pulmonary effort is normal.      Comments: Coarse breath sounds  Abdominal:      General: Abdomen is flat.      Palpations: Abdomen is soft.      Tenderness: There is no abdominal tenderness.   Musculoskeletal:         General: Normal range of motion.      Cervical back: Normal range of motion.      Right lower leg: Edema (1+ pitting pedal edema) present.      Left lower leg: Edema (1+ pitting edema) present.   Skin:     General: Skin is warm and dry.      Coloration: Skin is not jaundiced.      Findings: No erythema.   Neurological:      Mental Status: She is disoriented.      Motor: Weakness (5/5 UE strength. 5/5 L sided LE strength, 2/5 R sided LE strength) present.        Last Recorded Vitals  Blood pressure 112/58, pulse 81, temperature 36.9 °C (98.4 °F), resp. rate 22, SpO2 97%.    Relevant Results    Scheduled medications  enoxaparin, 40 mg, subcutaneous, q24h  insulin lispro, 0-5 Units, subcutaneous, TID      Continuous medications  sodium chloride 0.9%, 100 mL/hr      PRN medications  PRN medications: acetaminophen, dextrose, dextrose, glucagon,  glucagon, morphine    Results for orders placed or performed during the hospital encounter of 08/29/24 (from the past 24 hour(s))   CBC and Auto Differential   Result Value Ref Range    WBC 10.2 4.4 - 11.3 x10*3/uL    nRBC 0.0 0.0 - 0.0 /100 WBCs    RBC 3.52 (L) 4.00 - 5.20 x10*6/uL    Hemoglobin 9.6 (L) 12.0 - 16.0 g/dL    Hematocrit 30.4 (L) 36.0 - 46.0 %    MCV 86 80 - 100 fL    MCH 27.3 26.0 - 34.0 pg    MCHC 31.6 (L) 32.0 - 36.0 g/dL    RDW 13.9 11.5 - 14.5 %    Platelets 194 150 - 450 x10*3/uL    Neutrophils % 84.3 40.0 - 80.0 %    Immature Granulocytes %, Automated 0.5 0.0 - 0.9 %    Lymphocytes % 7.0 13.0 - 44.0 %    Monocytes % 5.5 2.0 - 10.0 %    Eosinophils % 2.6 0.0 - 6.0 %    Basophils % 0.1 0.0 - 2.0 %    Neutrophils Absolute 8.59 (H) 1.60 - 5.50 x10*3/uL    Immature Granulocytes Absolute, Automated 0.05 0.00 - 0.50 x10*3/uL    Lymphocytes Absolute 0.71 (L) 0.80 - 3.00 x10*3/uL    Monocytes Absolute 0.56 0.05 - 0.80 x10*3/uL    Eosinophils Absolute 0.27 0.00 - 0.40 x10*3/uL    Basophils Absolute 0.01 0.00 - 0.10 x10*3/uL   Comprehensive metabolic panel   Result Value Ref Range    Glucose 184 (H) 74 - 99 mg/dL    Sodium 128 (L) 136 - 145 mmol/L    Potassium 4.1 3.5 - 5.3 mmol/L    Chloride 93 (L) 98 - 107 mmol/L    Bicarbonate 25 21 - 32 mmol/L    Anion Gap 14 10 - 20 mmol/L    Urea Nitrogen 19 6 - 23 mg/dL    Creatinine 0.95 0.50 - 1.05 mg/dL    eGFR 63 >60 mL/min/1.73m*2    Calcium 9.1 8.6 - 10.3 mg/dL    Albumin 3.0 (L) 3.4 - 5.0 g/dL    Alkaline Phosphatase 92 33 - 136 U/L    Total Protein 6.3 (L) 6.4 - 8.2 g/dL    AST 8 (L) 9 - 39 U/L    Bilirubin, Total 0.8 0.0 - 1.2 mg/dL    ALT 3 (L) 7 - 45 U/L   Magnesium   Result Value Ref Range    Magnesium 1.39 (L) 1.60 - 2.40 mg/dL   Lactate   Result Value Ref Range    Lactate 1.2 0.4 - 2.0 mmol/L   Sars-CoV-2 PCR   Result Value Ref Range    Coronavirus 2019, PCR Not Detected Not Detected   POCT GLUCOSE   Result Value Ref Range    POCT Glucose 130 (H) 74 -  99 mg/dL   POCT GLUCOSE   Result Value Ref Range    POCT Glucose 165 (H) 74 - 99 mg/dL     CT head wo IV contrast    Result Date: 8/29/2024  Interpreted By:  Mason Marquis, STUDY: CT HEAD WO IV CONTRAST;  8/29/2024 5:12 pm   INDICATION: Signs/Symptoms:confusion, hx of CA, weakness.   COMPARISON: 10/04/2021   ACCESSION NUMBER(S): [id number]   ORDERING CLINICIAN: JOCELIN VALENCIA   TECHNIQUE: Sequential trans axial images were obtained  .   FINDINGS: INTRACRANIAL:   There is moderate prominence of the cortical sulci indicating atrophy.   There is moderate ventriculomegaly, again consistent with atrophy.   Mild decreased attenuation of the periventricular and long tracks of the white matter most consistent with gliosis from arterial disease. There is no evidence of definite subacute infarction, intracranial hemorrhage or mass.     EXTRACRANIAL: Mild mucosal thickening of the maxillary air cells indicating mild sinusitis. The calvarium is intact.       Moderate age related degenerative change as described without acute findings or significant change from the prior exam.   Signed by: Mason Marquis 8/29/2024 5:17 PM Dictation workstation:   [id number]    Lower extremity venous duplex bilateral    Result Date: 8/29/2024  Preliminary Cardiology Report          [place] [address] Tel [phone] and Fax [phone]          Preliminary Vascular Lab Report  [facility] LOWER EXTREMITY VENOUS DUPLEX BILATERAL  Patient Name:      DEMETRA TRAN Michael Physician: [id number] Jennifer Maradiaga MD,                                                    RPVI Study Date:        8/29/2024    Ordering           [id number] JOCELIN VALENCIA                                 Physician: MRN/PID:           [id number]     Technologist:      Padma HERNANDEZ Accession#:        [id number] Technologist 2: Date of Birth/Age: 1950     Encounter#:        [id number] Gender:            F Admission Status:  Emergency    Location            [facility]                                 Performed:  Diagnosis/ICD: Localized (leg) edema-R60.0 Indication:    Limb edema Procedure/CPT: 10263 Peripheral venous duplex scan for DVT complete  Patient History: Cancer.  PRELIMINARY CONCLUSIONS: Right Lower Venous: No evidence of acute deep vein thrombus visualized in the right lower extremity. Additional Findings; Cystic Structure right pop fossa at 3.6cm x 1.4cm. Left Lower Venous: No evidence of acute deep vein thrombus visualized in the left lower extremity.  Imaging & Doppler Findings:  Right                 Compressible Thrombus        Flow Distal External Iliac     Yes        None CFV                       Yes        None   Spontaneous/Phasic PFV                       Yes        None FV Proximal               Yes        None   Spontaneous/Phasic FV Mid                    Yes        None FV Distal                 Yes        None Popliteal                 Yes        None   Spontaneous/Phasic Peroneal                  Yes        None PTV                       Yes        None  Left                  Compress Thrombus        Flow Distal External Iliac   Yes      None CFV                     Yes      None   Spontaneous/Phasic PFV                     Yes      None FV Proximal             Yes      None   Spontaneous/Phasic FV Mid                  Yes      None FV Distal               Yes      None Popliteal               Yes      None   Spontaneous/Phasic Peroneal                Yes      None PTV                     Yes      None VASCULAR PRELIMINARY REPORT completed by Padma HERNANDEZ on 8/29/2024 at 4:30:51 PM  ** Final **     CT chest abdomen pelvis wo IV contrast    Result Date: 8/29/2024  STUDY: CT Chest, Abdomen, and Pelvis without IV Contrast; 8/29/2024 12:43 PM INDICATION: Increased mets to lungs, abdominal pain. COMPARISON: XR chest 8/29/2024. ACCESSION NUMBER(S): [id number] ORDERING CLINICIAN: SANJUANA HUERTAS TECHNIQUE: CT of the chest,  abdomen, and pelvis was performed.  Contiguous axial images were obtained at 3 mm slice thickness through the chest, abdomen, and pelvis.  Coronal and sagittal reconstructions at 3 mm slice thickness were performed.  No intravenous contrast was administered.  FINDINGS: Please note that the evaluation of vessels, lymph nodes and organs is limited without intravenous contrast. CHEST: There are numerous bilateral lung nodules compatible with diffuse metastases. At least 10-15 nodules are seen in each lung. Largest nodule in the inferior right upper lobe measures 5.0 x 4.0 cm (series 603B, slice 100/301). Some of the smaller nodules demonstrate central cavitation. There is associated trace pleural fluid. There is no pneumothorax. In the mediastinum, the heart is normal size with dense coronary artery calcifications. Thoracic aorta is normal caliber. There is no bulky lymphadenopathy. There is no pericardial effusion. ABDOMEN: No lesions are detectable in the liver. Spleen is minimally enlarged. The pancreas is fatty replaced. Adrenal glands are normal. Gallbladder is minimally distended with a small stone. Kidneys are normal size with minimal perinephric stranding and some tiny vascular calcifications. There is no hydronephrosis. There is no bulky abdominal or retroperitoneal lymphadenopathy. Abdominal aorta is normal caliber. There is no ascites. The stomach is decompressed. There is no small bowel thickening or obstruction. There is moderate proximal stool burden. PELVIS: There is moderate thickening and nodularity in the distal rectosigmoid colon, suggesting the patient's primary tumor. There is haziness and minimal fluid in the perirectal and presacral fat. Adjacent bladder is decompressed. Uterus is atrophic or absent. There are no significant hernias. BONES: There are no blastic or lytic lesions. There is advanced arthritis at the right shoulder. Mild spondylosis is seen throughout the spine. There is minimal  compression deformity at T12. There is minimal degenerative arthritis and spondylolisthesis at L4-5.    1. Numerous large bilateral lung nodules compatible with diffuse metastases. 2. Moderate thickening and nodularity distal rectosigmoid colon, suggesting the primary tumor; moderate proximal stool burden. 3. No bowel obstruction, abscess, or free air. 4. Remainder as above. Signed by Randy Hopper MD    XR chest 2 views    Result Date: 8/29/2024  STUDY: Chest Radiographs;  8/29/2024 9:25AM INDICATION: Cough, weakness. COMPARISON: 11/23/2020 XR Chest. ACCESSION NUMBER(S): [id number] ORDERING CLINICIAN: SANJUANA HUERTAS TECHNIQUE:  Frontal and lateral chest (three images). FINDINGS: CARDIOMEDIASTINAL SILHOUETTE: Cardiomediastinal silhouette is normal in size and configuration. Right IJ infusion catheter extends into the SVC.  LUNGS: Multiple bilateral large lung masses/nodules noted largest one measuring 5.2 cm within the right upper lobe consistent with metastatic disease.  No pleural effusion or pneumothorax.  ABDOMEN: No remarkable upper abdominal findings.  BONES: No acute osseous changes.    Bilateral lung masses consistent with metastatic disease. Signed by Gaudencio Bates MD        Assessment/Plan   Assessment & Plan  Hyponatremia  #1/hX  of colon cancer with metastasis to lungs/and as per  no further oncology follow-ups have been done or any treatment received in over a year.  This was the 's and patient's decision.  Patient is DNR CC.  Generalized weakness  Decreased appetite  History of dementia  -Oncology and palliative care/hospice consult and recommendations appreciated (Pt is DNRCC)  -Imaging results as above, CT head added on to imaging due to confusion, hyponatremia, possibility of metastasis to brain  -Pain medication as needed  -IV fluids  -Dietitian recommendations   -SW and TCC eval and treat, possible placement    #2 diarrhea  -Pt  and best friend at bedside report patient has  been having diarrhea x 1 week and fever yesterday, does endorse recent antibiotic use  -Stool pathogen and C diff PCR ordered    #3 hyponatremia  Hypochloremia  Hypomagnesemia  -IV fluids, patient's  endorses poor oral intake by patient x 3 days- likely due to dehydration  -Magnesium replaced in the ED  -Monitor with BMP in the a.m.    #4 anemia  -Hemoglobin 9.6 today (7.8 on 10/5/2021)  -Patient denies any signs of overt bleeding. Monitor for signs of bleeding, monitor with CBC in a.m.    #5/poor p.o. intake/hypoalbuminemia  -Dietitian consult and recommendations appreciated  -Wound prevention    #6 hypertension  #7 hyperlipidemia  #8 T2DM  -SSI  -Hypoglycemic protocol  -Diabetic diet    #9 DVT prophylaxis  -Lovenox  -SCDs  Medications reconciled.  Labs and vitals noted.       I spent 60 mins minutes in the professional and overall care of this patient.      Hernan Cuevas MD

## 2024-08-30 NOTE — CONSULTS
Consults    Reason For Consult  Patient request has known colon cancer with mets to lungs    History Of Present Illness  Mehnaz Nunez is a 74 y.o. female with a history of hypertension, hyperlipidemia, type 2 diabetes, dementia and colon cancer diagnosed on 3/21 with metastasis to the lung status post chemotherapy (reported last chemo in August 2023) presented to [facility] for generalized weakness.  I had an extensive conversation with the patient and her  at bedside and they reported to me that the patient had colon cancer which she received treatment for 2 years in [facility].  Per the patient and her  the patient had many side effects of the chemotherapy and they do not believe that it is worth undergoing this again if the malignancy has recurred/metastasized.  I was not able to obtain any records in the EMR.  Patient has been feeling weak, decreased appetite along with diarrhea and a cough over the past week.  A chest x-ray in the ED showed multiple bilateral large lung masses/nodules consistent with metastatic disease.  A CT abdomen was obtained which showed moderate thickening and nodularity in the distal rectosigmoid colon suggesting the primary tumor.     Past Medical History  As mentioned above    Surgical History  She has a past surgical history that includes Other surgical history (02/25/2021); Other surgical history (02/25/2021); and Other surgical history (02/25/2021).     Social History  She reports that she has never smoked. She has never used smokeless tobacco. She reports that she does not drink alcohol. No history on file for drug use.    Family History  No family history on file.     Allergies  Patient has no known allergies.    Review of Systems  A review of systems was performed and was negative unless mentioned above     Physical Exam  Tired appearing, NAD  Head normocephalic, mucous membranes dry, anicteric sclera  Regular rate and rhythm  Coarse breath sounds  auscultated bilaterally  Bilateral pitting edema noted  Flat affect       Last Recorded Vitals  /65 (BP Location: Right arm, Patient Position: Lying)   Pulse 78   Temp 36.7 °C (98.1 °F) (Temporal)   Resp 22   Wt 89.6 kg (197 lb 8.5 oz)   SpO2 97%     Relevant Results     Assessment/Plan   Mehnaz Nunez is a 74 y.o. female with a history of hypertension, hyperlipidemia, type 2 diabetes, dementia and colon cancer diagnosed on 3/21 with metastasis to the lung status post chemotherapy (reported last chemo in August 2023) presented to [facility] for generalized weakness.      #Suspect recurrence with the possibility of metastasis of colon cancer  -Patient has elected to be DNR CC, I reconfirmed this CODE STATUS with her and her .  I discussed all the possible side effects of undergoing chemotherapy/radiation if they wanted to pursue this but prior to even initiating the conversation they had already told me that they do not want any sort of chemotherapy as they did not feel that any side effects were worth prolonging her life.  They also declined any further workup.  At this time we recommend palliative/hospice along with supportive measures.    This is a preliminary note written by the resident. Please wait for attending addendum for finalization of note and recommendations.    Riza Sheehan DO, PGY-2  Internal Medicine

## 2024-08-30 NOTE — CONSULTS
Inpatient consult to Palliative Care  Consult performed by: ANNABELLE Weller-CNP  Consult ordered by: Anabelle Heard PA-C          Reason For Consult  Reason for Consult: communication / medical decision making     History Of Present Illness  Mehnaz Nunez is a 74 y.o. female with past medical history of  colon cancer (diagnosed March 2021) with metastasis to the lungs status post chemo (last chemo August 2023, dementia, type 2 diabetes mellitus, hypertension, and hyperlipidemia, was admitted under the medicine service 8/29/2024 with hyponatremia.  The patient's  and best friend reported the patient has had decreased appetite and generally no food or water for the past 3 days as well as decreased movement and increased weakness.  Her baseline is ability to ambulate with a walker, but they stated she has been too weak to do so recently.  They also reported the patient has not been speaking much and the patient's  endorses the patient has had diarrhea for the past week as well as a low-grade fever; he stated she was treated with antibiotics for suspected bronchitis several weeks ago and did endorse that the patient has a dry cough.  Significant findings in the ED included hyponatremia 128, hypomagnesemia 1.39, anemia with hemoglobin 9.6, chest x-ray showing multiple bilateral large lung masses/nodules consistent with metastatic disease, and CT chest/abdomen/pelvis showing moderate thickening and nodularity of the distal rectosigmoid colon suggesting primary tumor as well as a moderate proximal stool burden.  The patient was treated with IV fluids as well as IV magnesium.  Family did report the patient has not been seeking further oncologic treatment for her known cancer and the patient's  did change CODE STATUS to DNR CC.  Palliative care was consulted to discuss goals of care and advance care planning.    Upon assessment of the patient this morning, the patient was heavily lethargic and without  signs of distress.    ESAS ([place] Symptom Assessment System): Unable to assess due to the patient's current condition and mentation.    PPS (Palliative Prognostic Scale): 10%    PPI (Palliative Prognostic Index): 6.5    PMH: as above     Personal/Social History  The patient previously lives at home with her .  Documentation supports she is a never smoker with no current alcohol nor illicit drug use.     Past Medical History  She has a past medical history of Abnormal findings on diagnostic imaging of other specified body structures and Personal history of malignant neoplasm, unspecified.    Surgical History  She has a past surgical history that includes Other surgical history (02/25/2021); Other surgical history (02/25/2021); and Other surgical history (02/25/2021).     Family History  No family history on file.  Allergies  Patient has no known allergies.    Review of Systems   Reason unable to perform ROS: Current condition and mentation.        Physical Exam  Constitutional:       General: She is not in acute distress.     Appearance: She is ill-appearing. She is not toxic-appearing or diaphoretic.      Comments: Heavily drowsy/lethargic, without restlessness, frail   HENT:      Head: Normocephalic and atraumatic.      Right Ear: External ear normal.      Left Ear: External ear normal.      Nose: Nose normal.      Mouth/Throat:      Mouth: Mucous membranes are moist.      Pharynx: Oropharynx is clear.   Eyes:      Pupils: Pupils are equal, round, and reactive to light.   Cardiovascular:      Rate and Rhythm: Normal rate and regular rhythm.      Pulses: Normal pulses.      Heart sounds: Normal heart sounds.   Pulmonary:      Effort: Pulmonary effort is normal. No respiratory distress.      Comments: Upper respiratory tract expiratory wheeze noted; bilateral lungs noted to be largely clear to auscultation  Abdominal:      General: Bowel sounds are normal. There is no distension.      Palpations: Abdomen is  "soft.   Musculoskeletal:      Cervical back: No rigidity.      Right lower leg: No edema.      Left lower leg: No edema.   Skin:     General: Skin is warm and dry.      Coloration: Skin is pale.   Neurological:      Comments: Heavily drowsy/lethargic.  Does moan when blankets are removed for assessment.  Does not follow simple commands.   Psychiatric:      Comments: Largely without restlessness.         Last Recorded Vitals  Blood pressure 129/65, pulse 78, temperature 36.7 °C (98.1 °F), temperature source Temporal, resp. rate 22, height 1.6 m (5' 2.99\"), weight 89.6 kg (197 lb 8.5 oz), SpO2 97%.    Relevant Results  Scheduled medications  enoxaparin, 40 mg, subcutaneous, q24h  insulin lispro, 0-5 Units, subcutaneous, TID      Continuous medications     PRN medications  PRN medications: acetaminophen, dextrose, dextrose, glucagon, glucagon, morphine    Results for orders placed or performed during the hospital encounter of 08/29/24 (from the past 96 hour(s))   CBC and Auto Differential   Result Value Ref Range    WBC 10.2 4.4 - 11.3 x10*3/uL    nRBC 0.0 0.0 - 0.0 /100 WBCs    RBC 3.52 (L) 4.00 - 5.20 x10*6/uL    Hemoglobin 9.6 (L) 12.0 - 16.0 g/dL    Hematocrit 30.4 (L) 36.0 - 46.0 %    MCV 86 80 - 100 fL    MCH 27.3 26.0 - 34.0 pg    MCHC 31.6 (L) 32.0 - 36.0 g/dL    RDW 13.9 11.5 - 14.5 %    Platelets 194 150 - 450 x10*3/uL    Neutrophils % 84.3 40.0 - 80.0 %    Immature Granulocytes %, Automated 0.5 0.0 - 0.9 %    Lymphocytes % 7.0 13.0 - 44.0 %    Monocytes % 5.5 2.0 - 10.0 %    Eosinophils % 2.6 0.0 - 6.0 %    Basophils % 0.1 0.0 - 2.0 %    Neutrophils Absolute 8.59 (H) 1.60 - 5.50 x10*3/uL    Immature Granulocytes Absolute, Automated 0.05 0.00 - 0.50 x10*3/uL    Lymphocytes Absolute 0.71 (L) 0.80 - 3.00 x10*3/uL    Monocytes Absolute 0.56 0.05 - 0.80 x10*3/uL    Eosinophils Absolute 0.27 0.00 - 0.40 x10*3/uL    Basophils Absolute 0.01 0.00 - 0.10 x10*3/uL   Comprehensive metabolic panel   Result Value Ref " Range    Glucose 184 (H) 74 - 99 mg/dL    Sodium 128 (L) 136 - 145 mmol/L    Potassium 4.1 3.5 - 5.3 mmol/L    Chloride 93 (L) 98 - 107 mmol/L    Bicarbonate 25 21 - 32 mmol/L    Anion Gap 14 10 - 20 mmol/L    Urea Nitrogen 19 6 - 23 mg/dL    Creatinine 0.95 0.50 - 1.05 mg/dL    eGFR 63 >60 mL/min/1.73m*2    Calcium 9.1 8.6 - 10.3 mg/dL    Albumin 3.0 (L) 3.4 - 5.0 g/dL    Alkaline Phosphatase 92 33 - 136 U/L    Total Protein 6.3 (L) 6.4 - 8.2 g/dL    AST 8 (L) 9 - 39 U/L    Bilirubin, Total 0.8 0.0 - 1.2 mg/dL    ALT 3 (L) 7 - 45 U/L   Magnesium   Result Value Ref Range    Magnesium 1.39 (L) 1.60 - 2.40 mg/dL   Lactate   Result Value Ref Range    Lactate 1.2 0.4 - 2.0 mmol/L   Sars-CoV-2 PCR   Result Value Ref Range    Coronavirus 2019, PCR Not Detected Not Detected   POCT GLUCOSE   Result Value Ref Range    POCT Glucose 130 (H) 74 - 99 mg/dL   POCT GLUCOSE   Result Value Ref Range    POCT Glucose 165 (H) 74 - 99 mg/dL   POCT GLUCOSE   Result Value Ref Range    POCT Glucose 150 (H) 74 - 99 mg/dL   Urinalysis with Reflex Culture and Microscopic   Result Value Ref Range    Color, Urine Orange (N) Light-Yellow, Yellow, Dark-Yellow    Appearance, Urine Turbid (N) Clear    Specific Gravity, Urine 1.021 1.005 - 1.035    pH, Urine 6.5 5.0, 5.5, 6.0, 6.5, 7.0, 7.5, 8.0    Protein, Urine 70 (1+) (A) NEGATIVE, 10 (TRACE), 20 (TRACE) mg/dL    Glucose, Urine 30 (TRACE) (A) Normal mg/dL    Blood, Urine OVER (3+) (A) NEGATIVE    Ketones, Urine NEGATIVE NEGATIVE mg/dL    Bilirubin, Urine NEGATIVE NEGATIVE    Urobilinogen, Urine Normal Normal mg/dL    Nitrite, Urine NEGATIVE NEGATIVE    Leukocyte Esterase, Urine 500 Silverio/µL (A) NEGATIVE   Microscopic Only, Urine   Result Value Ref Range    WBC, Urine 21-50 (A) 1-5, NONE /HPF    WBC Clumps, Urine RARE Reference range not established. /HPF    RBC, Urine 11-20 (A) NONE, 1-2, 3-5 /HPF    Bacteria, Urine 4+ (A) NONE SEEN /HPF    Mucus, Urine FEW Reference range not established. /LPF     Calcium Oxalate Crystals, Urine 4+ (A) NONE, 1+ /HPF   CBC   Result Value Ref Range    WBC 7.4 4.4 - 11.3 x10*3/uL    nRBC 0.0 0.0 - 0.0 /100 WBCs    RBC 3.12 (L) 4.00 - 5.20 x10*6/uL    Hemoglobin 8.4 (L) 12.0 - 16.0 g/dL    Hematocrit 27.0 (L) 36.0 - 46.0 %    MCV 87 80 - 100 fL    MCH 26.9 26.0 - 34.0 pg    MCHC 31.1 (L) 32.0 - 36.0 g/dL    RDW 14.1 11.5 - 14.5 %    Platelets 202 150 - 450 x10*3/uL   Basic metabolic panel   Result Value Ref Range    Glucose 133 (H) 74 - 99 mg/dL    Sodium 131 (L) 136 - 145 mmol/L    Potassium 4.1 3.5 - 5.3 mmol/L    Chloride 99 98 - 107 mmol/L    Bicarbonate 25 21 - 32 mmol/L    Anion Gap 11 10 - 20 mmol/L    Urea Nitrogen 19 6 - 23 mg/dL    Creatinine 0.96 0.50 - 1.05 mg/dL    eGFR 62 >60 mL/min/1.73m*2    Calcium 8.3 (L) 8.6 - 10.3 mg/dL   Magnesium   Result Value Ref Range    Magnesium 1.66 1.60 - 2.40 mg/dL   SST TOP   Result Value Ref Range    Extra Tube Hold for add-ons.      CT head wo IV contrast    Result Date: 8/29/2024  Interpreted By:  Mason Marquis, STUDY: CT HEAD WO IV CONTRAST;  8/29/2024 5:12 pm   INDICATION: Signs/Symptoms:confusion, hx of CA, weakness.   COMPARISON: 10/04/2021   ACCESSION NUMBER(S): [id number]   ORDERING CLINICIAN: JOCELIN VALENCIA   TECHNIQUE: Sequential trans axial images were obtained  .   FINDINGS: INTRACRANIAL:   There is moderate prominence of the cortical sulci indicating atrophy.   There is moderate ventriculomegaly, again consistent with atrophy.   Mild decreased attenuation of the periventricular and long tracks of the white matter most consistent with gliosis from arterial disease. There is no evidence of definite subacute infarction, intracranial hemorrhage or mass.     EXTRACRANIAL: Mild mucosal thickening of the maxillary air cells indicating mild sinusitis. The calvarium is intact.       Moderate age related degenerative change as described without acute findings or significant change from the prior exam.   Signed by: Mason Marquis  8/29/2024 5:17 PM Dictation workstation:   [id number]    Lower extremity venous duplex bilateral    Result Date: 8/29/2024  Preliminary Cardiology Report          [facility] [address] [place] [address] Tel [phone] and Fax [phone]          Preliminary Vascular Lab Report  [facility] LOWER EXTREMITY VENOUS DUPLEX BILATERAL  Patient Name:      DEMETRA TRAN Michael Physician: [id number] Jennifer Maradiaga MD,                                                    RPVI Study Date:        8/29/2024    Ordering           [id number] JOCELIN VALENCIA                                 Physician: MRN/PID:           [id number]     Technologist:      Padma Kerr [facility] Accession#:        [id number] Technologist 2: Date of Birth/Age: 1950     Encounter#:        [id number] Gender:            F Admission Status:  Emergency    Location           [facility]                                 Performed:  Diagnosis/ICD: Localized (leg) edema-R60.0 Indication:    Limb edema Procedure/CPT: 70521 Peripheral venous duplex scan for DVT complete  Patient History: Cancer.  PRELIMINARY CONCLUSIONS: Right Lower Venous: No evidence of acute deep vein thrombus visualized in the right lower extremity. Additional Findings; Cystic Structure right pop fossa at 3.6cm x 1.4cm. Left Lower Venous: No evidence of acute deep vein thrombus visualized in the left lower extremity.  Imaging & Doppler Findings:  Right                 Compressible Thrombus        Flow Distal External Iliac     Yes        None CFV                       Yes        None   Spontaneous/Phasic PFV                       Yes        None FV Proximal               Yes        None   Spontaneous/Phasic FV Mid                    Yes        None FV Distal                 Yes        None Popliteal                 Yes        None   Spontaneous/Phasic Peroneal                  Yes        None PTV                       Yes        None  Left                  Compress Thrombus         Flow Distal External Iliac   Yes      None CFV                     Yes      None   Spontaneous/Phasic PFV                     Yes      None FV Proximal             Yes      None   Spontaneous/Phasic FV Mid                  Yes      None FV Distal               Yes      None Popliteal               Yes      None   Spontaneous/Phasic Peroneal                Yes      None PTV                     Yes      None VASCULAR PRELIMINARY REPORT completed by Padma HERNANDEZ on 8/29/2024 at 4:30:51 PM  ** Final **     CT chest abdomen pelvis wo IV contrast    Result Date: 8/29/2024  STUDY: CT Chest, Abdomen, and Pelvis without IV Contrast; 8/29/2024 12:43 PM INDICATION: Increased mets to lungs, abdominal pain. COMPARISON: XR chest 8/29/2024. ACCESSION NUMBER(S): [id number] ORDERING CLINICIAN: SANJUANA HUERTAS TECHNIQUE: CT of the chest, abdomen, and pelvis was performed.  Contiguous axial images were obtained at 3 mm slice thickness through the chest, abdomen, and pelvis.  Coronal and sagittal reconstructions at 3 mm slice thickness were performed.  No intravenous contrast was administered.  FINDINGS: Please note that the evaluation of vessels, lymph nodes and organs is limited without intravenous contrast. CHEST: There are numerous bilateral lung nodules compatible with diffuse metastases. At least 10-15 nodules are seen in each lung. Largest nodule in the inferior right upper lobe measures 5.0 x 4.0 cm (series 603B, slice 100/301). Some of the smaller nodules demonstrate central cavitation. There is associated trace pleural fluid. There is no pneumothorax. In the mediastinum, the heart is normal size with dense coronary artery calcifications. Thoracic aorta is normal caliber. There is no bulky lymphadenopathy. There is no pericardial effusion. ABDOMEN: No lesions are detectable in the liver. Spleen is minimally enlarged. The pancreas is fatty replaced. Adrenal glands are normal. Gallbladder is minimally distended with a  small stone. Kidneys are normal size with minimal perinephric stranding and some tiny vascular calcifications. There is no hydronephrosis. There is no bulky abdominal or retroperitoneal lymphadenopathy. Abdominal aorta is normal caliber. There is no ascites. The stomach is decompressed. There is no small bowel thickening or obstruction. There is moderate proximal stool burden. PELVIS: There is moderate thickening and nodularity in the distal rectosigmoid colon, suggesting the patient's primary tumor. There is haziness and minimal fluid in the perirectal and presacral fat. Adjacent bladder is decompressed. Uterus is atrophic or absent. There are no significant hernias. BONES: There are no blastic or lytic lesions. There is advanced arthritis at the right shoulder. Mild spondylosis is seen throughout the spine. There is minimal compression deformity at T12. There is minimal degenerative arthritis and spondylolisthesis at L4-5.    1. Numerous large bilateral lung nodules compatible with diffuse metastases. 2. Moderate thickening and nodularity distal rectosigmoid colon, suggesting the primary tumor; moderate proximal stool burden. 3. No bowel obstruction, abscess, or free air. 4. Remainder as above. Signed by Randy Hopper MD    XR chest 2 views    Result Date: 8/29/2024  STUDY: Chest Radiographs;  8/29/2024 9:25AM INDICATION: Cough, weakness. COMPARISON: 11/23/2020 XR Chest. ACCESSION NUMBER(S): [id number] ORDERING CLINICIAN: SANJUANA HUERTAS TECHNIQUE:  Frontal and lateral chest (three images). FINDINGS: CARDIOMEDIASTINAL SILHOUETTE: Cardiomediastinal silhouette is normal in size and configuration. Right IJ infusion catheter extends into the SVC.  LUNGS: Multiple bilateral large lung masses/nodules noted largest one measuring 5.2 cm within the right upper lobe consistent with metastatic disease.  No pleural effusion or pneumothorax.  ABDOMEN: No remarkable upper abdominal findings.  BONES: No acute osseous  changes.    Bilateral lung masses consistent with metastatic disease. Signed by Gaudencio Bates MD        Assessment/Plan   IMP:  Mehnaz Nunez is a 74 y.o. female with past medical history of colon cancer (diagnosed March 2021) with metastasis to the lungs status post chemo (last chemo August 2023, dementia, type 2 diabetes mellitus, hypertension, and hyperlipidemia,was admitted under the medicine service 8/29/2024 with hyponatremia.  The patient's  and best friend reported the patient has had decreased appetite and generally no food or water for the past 3 days as well as decreased movement and increased weakness.  Her baseline is ability to ambulate with a walker, but they stated she has been too weak to do so recently.  They also reported the patient has not been speaking much and the patient's  endorses the patient has had diarrhea for the past week as well as a low-grade fever; he stated she was treated with antibiotics for suspected bronchitis several weeks ago and did endorse that the patient has a dry cough.  Significant findings in the ED included hyponatremia 128, hypomagnesemia 1.39, anemia with hemoglobin 9.6, chest x-ray showing multiple bilateral large lung masses/nodules consistent with metastatic disease, and CT chest/abdomen/pelvis showing moderate thickening and nodularity of the distal rectosigmoid colon suggesting primary tumor as well as a moderate proximal stool burden.  The patient was treated with IV fluids as well as IV magnesium.  Family did report the patient has not been seeking further oncologic treatment for her known cancer and the patient's  did change CODE STATUS to DNR CC.  Palliative care was consulted to discuss goals of care and advance care planning.    Recommendations  -Maintain current as needed morphine for symptoms of discomfort including tachypnea, moaning, restlessness, or facial grimacing  -Hospice consult is highly appropriate, and the patient may  require placement to hospice IPU  -The patient does not appear appropriate for GIP hospice admission at this time, but can be evaluated for the same if she has further clinical deterioration    8/30/2024-   The patient did not appear to be in any obvious distress, and so no additional medications will be recommended per our service at this time.  The patient was placed on as needed morphine, and I did adjust the medication to be used for verbal or nonverbal signs of discomfort, including tachypnea, moaning, restlessness, and facial grimacing.  The patient's renal function appears normal, and so morphine is quite safe in this instance for hospice services.  It does appear as though the patient was previously being treated for her metastatic disease, however this has not occurred for approximately 1 year, and with her underlying dementia, comfort measures are quite appropriate for the patient, and so we do also agree that hospice services would be highly appropriate for her.  The patient is currently resting comfortably, and so would likely be very appropriate for outpatient placement to a hospice facility, if approved by a hospice agency for the same.  I do not feel as though the patient is appropriate for GIP hospice admission at this time, however, if she continues to have clinical deterioration, she can be reevaluated for potential GIP hospice admission.  I will plan to speak with the patient's  today regarding plans to proceed with hospice care in the outpatient setting.  I will then coordinate with the assigned hospital  for coordination of outpatient hospice services.  Palliative care will continue to follow.    Thank you for allowing us to participate in the care of this lady.  Should you have any further questions or concerns regarding her care, please do not hesitate to contact us via Haiku/Epic Chat (Kenzie Cheung during weekdays work hours and Franko Crow during weekdays after hours  and over the weekend)    (This note was generated with voice recognition software and may contain errors including spelling, grammar, syntax and misrecognition of what was dictated, that are not fully corrected.)      Patient/proxy preference for information  Prefers full information    Goals of Care  The patient is DNR CC CODE STATUS.  Hospice services in the outpatient setting would be best appropriate.      I spent 60 minutes in the professional and overall care of this patient.      Kenzie Cheung, APRN-CNP

## 2024-08-30 NOTE — ASSESSMENT & PLAN NOTE
#1/hX  of colon cancer with metastasis to lungs/and as per  no further oncology follow-ups have been done or any treatment received in over a year.  This was the 's and patient's decision.  Patient is DNR CC.  Generalized weakness  Decreased appetite  History of dementia  -Oncology and palliative care/hospice consult and recommendations appreciated (Pt is DNRCC)  -Imaging results as above, CT head added on to imaging due to confusion, hyponatremia, possibility of metastasis to brain  -Pain medication as needed  -IV fluids  -Dietitian recommendations   -SW and TCC eval and treat, possible placement    #2 diarrhea  -Pt  and best friend at bedside report patient has been having diarrhea x 1 week and fever yesterday, does endorse recent antibiotic use  -Stool pathogen and C diff PCR ordered    #3 hyponatremia  Hypochloremia  Hypomagnesemia  -IV fluids, patient's  endorses poor oral intake by patient x 3 days- likely due to dehydration  -Magnesium replaced in the ED  -Monitor with BMP in the a.m.    #4 anemia  -Hemoglobin 9.6 today (7.8 on 10/5/2021)  -Patient denies any signs of overt bleeding. Monitor for signs of bleeding, monitor with CBC in a.m.    #5/poor p.o. intake/hypoalbuminemia  -Dietitian consult and recommendations appreciated  -Wound prevention    #6 hypertension  #7 hyperlipidemia  #8 T2DM  -SSI  -Hypoglycemic protocol  -Diabetic diet    #9 DVT prophylaxis  -Lovenox  -SCDs

## 2024-08-30 NOTE — PROGRESS NOTES
08/30/24 1332   Discharge Planning   Living Arrangements Spouse/significant other   Support Systems Spouse/significant other;Friends/neighbors   Assistance Needed toltal assistance   Type of Residence Private residence   Expected Discharge Disposition Hospice   Does the patient need discharge transport arranged? Yes   RoundTrip coordination needed? Yes     Attempted to call patients  and the # is disconneted. The TCC reached out to the  Aidan and he said another person would call this TCC. Spoke with Maria Elena Kiran [phone] who is a longtime friend of the patient and her  Aidan. Maria Elena is also DPOA. Requested Maria Elena to bring these documents to the hospital. Aidan's correct #'s are [phone] or mobile [phone]. Discussed hospice services at home and in an IPU. Maria Elena would like a referral to [facility]. Explained that [facility] family would assist in coming up a discharge plan. Maria Elena is leaning toward the IPU. Referral sent to [facility] in [facility].

## 2024-08-30 NOTE — PROGRESS NOTES
Demetra Tran is a 74 y.o. female on day 1 of admission presenting with Hyponatremia.      Subjective   This is a duplicate note.  Please refer to the other note from today which was dictated and completed.       Objective     Last Recorded Vitals  /65 (BP Location: Right arm, Patient Position: Lying)   Pulse 78   Temp 36.7 °C (98.1 °F) (Temporal)   Resp 22   Wt 89.6 kg (197 lb 8.5 oz)   SpO2 97%   Intake/Output last 3 Shifts:    Intake/Output Summary (Last 24 hours) at 8/30/2024 1232  Last data filed at 8/30/2024 0003  Gross per 24 hour   Intake 1420 ml   Output --   Net 1420 ml       Admission Weight  Weight: 89.6 kg (197 lb 8.5 oz) (08/29/24 1941)    Daily Weight  08/29/24 : 89.6 kg (197 lb 8.5 oz)    Image Results  ECG 12 lead  Sinus rhythm with 1st degree AV block with frequent Premature ventricular complexes  Left axis deviation  Low voltage QRS  Inferior infarct , age undetermined  Cannot rule out Anterior infarct , age undetermined  Abnormal ECG  When compared with ECG of 04-OCT-2021 11:29,  PREVIOUS ECG IS PRESENT  Lower extremity venous duplex bilateral             [place] [address]  Tel [phone] and Fax [phone]       Vascular Lab Report  [facility] LOWER EXTREMITY VENOUS DUPLEX BILATERAL       Patient Name:      DEMETRA TRAN        Reading Physician:  [id number] Jennifer Maradiaga MD, RPVI  Study Date:        8/29/2024           Ordering Physician: [id number] VALENCIA  MRN/PID:           [id number]            Technologist:       Padma Kerr S  Accession#:        [id number]        Technologist 2:  Date of Birth/Age: 1950 / 74 years Encounter#:         [id number]  Gender:            F  Admission Status:  Emergency           Location Performed: [facility]       Diagnosis/ICD: Localized (leg) edema-R60.0  Indication:    Limb edema  CPT Codes:     62523 Peripheral venous duplex scan  for DVT complete       Patient History Cancer.       CONCLUSIONS:  Right popliteal fossa structure (details below); follow up suggested.    Right Lower Venous: No evidence of acute deep vein thrombus visualized in the right lower extremity. Additional Findings; Heterogeneous, non vascularized structure in the right pop fossa at 3.6cm x 1.4 cm. Findings could be consistent with a complex or rupturee cyst, however, clinical correlation and future follow-up recommended as its appearance is not entirely classical for a popliteal cyst.     Left Lower Venous: No evidence of acute deep vein thrombus visualized in the left lower extremity.     Imaging & Doppler Findings:     Right                 Compressible Thrombus        Flow  Distal External Iliac     Yes        None  CFV                       Yes        None   Spontaneous/Phasic  PFV                       Yes        None  FV Proximal               Yes        None   Spontaneous/Phasic  FV Mid                    Yes        None  FV Distal                 Yes        None  Popliteal                 Yes        None   Spontaneous/Phasic  Peroneal                  Yes        None  PTV                       Yes        None       Left                  Compress Thrombus        Flow  Distal External Iliac   Yes      None  CFV                     Yes      None   Spontaneous/Phasic  PFV                     Yes      None  FV Proximal             Yes      None   Spontaneous/Phasic  FV Mid                  Yes      None  FV Distal               Yes      None  Popliteal               Yes      None   Spontaneous/Phasic  Peroneal                Yes      None  PTV                     Yes      None       [id number] Jennifer Maradiaga MD, NAREN  Electronically signed by [id number] Jennifer Maradiaga MD, NAREN on 8/30/2024 at 9:59:11 AM       ** Final **      Physical Exam    Relevant Results               Assessment/Plan                  Assessment & Plan  Hyponatremia  History of colon cancer with metastasis  to lungs  Generalized weakness  Decreased appetite  History of dementia  -Oncology and palliative care/hospice consult and recommendations appreciated ([facility]  -Imaging results as above, CT head added on to imaging due to confusion, hyponatremia, possibility of metastasis to brain  -Pain medication as needed  -IV fluids  -Dietitian recommendations   -SW and TCC eval and treat, possible placement    Diarrhea  -Pt  and best friend at bedside report patient has been having diarrhea x 1 week and fever yesterday, does endorse recent antibiotic use  -Stool pathogen and C diff PCR ordered    Hyponatremia  Hypochloremia  Hypomagnesemia  -IV fluids, patient's  endorses poor oral intake by patient x 3 days- likely due to dehydration  -Magnesium replaced in the ED  -Monitor with BMP in the a.m.    Anemia  -Hemoglobin 9.6 today (7.8 on 10/5/2021)  -Patient denies any signs of overt bleeding. Monitor for signs of bleeding, monitor with CBC in a.m.    Hypoalbuminemia  -Dietitian consult and recommendations appreciated  -Wound prevention    Hypertension  Hyperlipidemia  T2DM  -SSI  -Hypoglycemic protocol  -Diabetic diet    DVT prophylaxis  -Lovenox  -SCDs                Hernan Cuevas MD

## 2024-08-30 NOTE — ASSESSMENT & PLAN NOTE
History of colon cancer with metastasis to lungs  Generalized weakness  Decreased appetite  History of dementia  -Oncology and palliative care/hospice consult and recommendations appreciated ([facility]  -Imaging results as above, CT head added on to imaging due to confusion, hyponatremia, possibility of metastasis to brain  -Pain medication as needed  -IV fluids  -Dietitian recommendations   -SW and TCC eval and treat, possible placement    Diarrhea  -Pt  and best friend at bedside report patient has been having diarrhea x 1 week and fever yesterday, does endorse recent antibiotic use  -Stool pathogen and C diff PCR ordered    Hyponatremia  Hypochloremia  Hypomagnesemia  -IV fluids, patient's  endorses poor oral intake by patient x 3 days- likely due to dehydration  -Magnesium replaced in the ED  -Monitor with BMP in the a.m.    Anemia  -Hemoglobin 9.6 today (7.8 on 10/5/2021)  -Patient denies any signs of overt bleeding. Monitor for signs of bleeding, monitor with CBC in a.m.    Hypoalbuminemia  -Dietitian consult and recommendations appreciated  -Wound prevention    Hypertension  Hyperlipidemia  T2DM  -SSI  -Hypoglycemic protocol  -Diabetic diet    DVT prophylaxis  -Lovenox  -SCDs

## 2024-08-30 NOTE — CARE PLAN
The patient's goals for the shift include comfort and safety    The clinical goals for the shift include Safety

## 2024-08-30 NOTE — CONSULTS
Nutrition Note:     Reason for Assessment: Provider consult order (assessment and pt/family education)    Patient is a 74 y.o. female presenting with hyponatremia. Chart reviewed - pt's code status was changed to DNR comfort measures only. Hospice consult was placed. Unclear reasoning as to why nutrition consult was placed. Spoke briefly with pt, , and pt's friend (friend was on the telephone) regarding nutrition interventions available with current code status. Explained that aggressive nutrition support is not appropriate with current code status to which pt and  agreed they would not want TF. Offered to obtain food preferences or provide nutrition supplements per pt preferences but pt declined. Explained that typically diet is liberalized to have no restriction while on comfort measures and encouraged family to bring in favorite foods from home if pt wishes. Declined offer for ONS. Diet liberalized to regular. Pt with no other nutrition concerns. Please reconsult for changes in plan of care/code status or need for RD.      Time Spent (min): 20 minutes

## 2024-08-30 NOTE — CARE PLAN
The patient's goals for the shift include comfort and safety    The clinical goals for the shift include Pain free

## 2024-08-31 LAB
BACTERIA UR CULT: ABNORMAL
GLUCOSE BLD MANUAL STRIP-MCNC: 144 MG/DL (ref 74–99)
GLUCOSE BLD MANUAL STRIP-MCNC: 174 MG/DL (ref 74–99)
GLUCOSE BLD MANUAL STRIP-MCNC: 195 MG/DL (ref 74–99)
GLUCOSE BLD MANUAL STRIP-MCNC: 218 MG/DL (ref 74–99)

## 2024-08-31 PROCEDURE — 1100000001 HC PRIVATE ROOM DAILY

## 2024-08-31 PROCEDURE — 2500000004 HC RX 250 GENERAL PHARMACY W/ HCPCS (ALT 636 FOR OP/ED)

## 2024-08-31 PROCEDURE — 82947 ASSAY GLUCOSE BLOOD QUANT: CPT

## 2024-08-31 PROCEDURE — 2500000002 HC RX 250 W HCPCS SELF ADMINISTERED DRUGS (ALT 637 FOR MEDICARE OP, ALT 636 FOR OP/ED)

## 2024-08-31 ASSESSMENT — PAIN SCALES - GENERAL
PAINLEVEL_OUTOF10: 0 - NO PAIN
PAINLEVEL_OUTOF10: 3
PAINLEVEL_OUTOF10: 7
PAINLEVEL_OUTOF10: 0 - NO PAIN

## 2024-08-31 ASSESSMENT — COGNITIVE AND FUNCTIONAL STATUS - GENERAL
HELP NEEDED FOR BATHING: A LOT
MOVING TO AND FROM BED TO CHAIR: A LOT
MOBILITY SCORE: 11
TOILETING: A LOT
TURNING FROM BACK TO SIDE WHILE IN FLAT BAD: A LOT
WALKING IN HOSPITAL ROOM: A LOT
DRESSING REGULAR UPPER BODY CLOTHING: A LOT
CLIMB 3 TO 5 STEPS WITH RAILING: TOTAL
PERSONAL GROOMING: A LITTLE
DAILY ACTIVITIY SCORE: 14
STANDING UP FROM CHAIR USING ARMS: A LOT
EATING MEALS: A LITTLE
MOVING FROM LYING ON BACK TO SITTING ON SIDE OF FLAT BED WITH BEDRAILS: A LOT
DRESSING REGULAR LOWER BODY CLOTHING: A LOT

## 2024-08-31 NOTE — PROGRESS NOTES
Demetra Tran is a 74 y.o. female on day 2 of admission presenting with Hyponatremia.      Subjective   Patient seen and examined by me.  Patient is resting in bed and appears in no acute distress.  She is awake alert oriented to her name and she knows she is in the hospital.  Vitals and labs noted.  Patient's p.o. intake is poor to fair.  I am however you       Objective     Last Recorded Vitals  /63   Pulse 77   Temp 37.3 °C (99.1 °F)   Resp 18   Wt 89.6 kg (197 lb 8.5 oz)   SpO2 96%   Intake/Output last 3 Shifts:  No intake or output data in the 24 hours ending 08/31/24 1825    Admission Weight  Weight: 89.6 kg (197 lb 8.5 oz) (08/29/24 1941)    Daily Weight  08/29/24 : 89.6 kg (197 lb 8.5 oz)    Image Results  ECG 12 lead  Sinus rhythm with 1st degree AV block with frequent Premature ventricular complexes  Left axis deviation  Low voltage QRS  Inferior infarct , age undetermined  Cannot rule out Anterior infarct , age undetermined  Abnormal ECG  When compared with ECG of 04-OCT-2021 11:29,  PREVIOUS ECG IS PRESENT  See ED provider note for full interpretation and clinical correlation  Confirmed by Denise Larose ([id number]) on 8/30/2024 2:55:28 PM  Lower extremity venous duplex bilateral             [place] [address]  Tel [phone] and Fax [phone]       Vascular Lab Report  [facility] LOWER EXTREMITY VENOUS DUPLEX BILATERAL       Patient Name:      DEMETRA TRAN        Reading Physician:  [id number] Jennifer Maradiaga MD, RPVI  Study Date:        8/29/2024           Ordering Physician: [id number] VALENCIA  MRN/PID:           [id number]            Technologist:       Padma Kerr S  Accession#:        [id number]        Technologist 2:  Date of Birth/Age: 1950 / 74 years Encounter#:         [id number]  Gender:            F  Admission Status:  Emergency           Location Performed: [place]  [facility]       Diagnosis/ICD: Localized (leg) edema-R60.0  Indication:    Limb edema  CPT Codes:     87260 Peripheral venous duplex scan for DVT complete       Patient History Cancer.       CONCLUSIONS:  Right popliteal fossa structure (details below); follow up suggested.    Right Lower Venous: No evidence of acute deep vein thrombus visualized in the right lower extremity. Additional Findings; Heterogeneous, non vascularized structure in the right pop fossa at 3.6cm x 1.4 cm. Findings could be consistent with a complex or rupturee cyst, however, clinical correlation and future follow-up recommended as its appearance is not entirely classical for a popliteal cyst.     Left Lower Venous: No evidence of acute deep vein thrombus visualized in the left lower extremity.     Imaging & Doppler Findings:     Right                 Compressible Thrombus        Flow  Distal External Iliac     Yes        None  CFV                       Yes        None   Spontaneous/Phasic  PFV                       Yes        None  FV Proximal               Yes        None   Spontaneous/Phasic  FV Mid                    Yes        None  FV Distal                 Yes        None  Popliteal                 Yes        None   Spontaneous/Phasic  Peroneal                  Yes        None  PTV                       Yes        None       Left                  Compress Thrombus        Flow  Distal External Iliac   Yes      None  CFV                     Yes      None   Spontaneous/Phasic  PFV                     Yes      None  FV Proximal             Yes      None   Spontaneous/Phasic  FV Mid                  Yes      None  FV Distal               Yes      None  Popliteal               Yes      None   Spontaneous/Phasic  Peroneal                Yes      None  PTV                     Yes      None       [id number] Jennifer Maradiaga MD, NAREN  Electronically signed by [id number] Jennifer Maradiaga MD, NAREN on 8/30/2024 at 9:59:11 AM       ** Final **      Physical  Exam  Vitals and nursing note reviewed.   Constitutional:       General: She is not in acute distress.     Appearance: Normal appearance. She is obese. She is not ill-appearing or toxic-appearing.   HENT:      Head: Normocephalic and atraumatic.      Right Ear: Tympanic membrane and ear canal normal. There is no impacted cerumen.      Left Ear: Tympanic membrane, ear canal and external ear normal.      Nose: Nose normal. No congestion or rhinorrhea.      Mouth/Throat:      Pharynx: Oropharynx is clear. No oropharyngeal exudate or posterior oropharyngeal erythema.   Eyes:      General: No scleral icterus.        Right eye: No discharge.         Left eye: No discharge.      Extraocular Movements: Extraocular movements intact.      Conjunctiva/sclera: Conjunctivae normal.      Pupils: Pupils are equal, round, and reactive to light.   Neck:      Vascular: No carotid bruit.   Cardiovascular:      Rate and Rhythm: Normal rate and regular rhythm.      Pulses: Normal pulses.      Heart sounds: Normal heart sounds. No murmur heard.     No gallop.   Pulmonary:      Effort: Pulmonary effort is normal. No respiratory distress.      Breath sounds: Normal breath sounds. No wheezing, rhonchi or rales.   Abdominal:      General: Abdomen is flat. Bowel sounds are normal. There is no distension.      Palpations: Abdomen is soft. There is no mass.      Tenderness: There is no abdominal tenderness. There is no right CVA tenderness, left CVA tenderness, guarding or rebound.      Hernia: No hernia is present.   Musculoskeletal:         General: No swelling or tenderness. Normal range of motion.      Cervical back: Normal range of motion and neck supple. No rigidity.      Right lower leg: No edema.      Left lower leg: No edema.   Lymphadenopathy:      Cervical: No cervical adenopathy.   Skin:     General: Skin is warm.      Coloration: Skin is not jaundiced.      Findings: No erythema or rash.   Neurological:      General: No focal  deficit present.      Mental Status: She is alert. Mental status is at baseline. She is confused.      Sensory: No sensory deficit.      Motor: No weakness.      Gait: Gait normal.      Deep Tendon Reflexes: Reflexes normal.      Comments: Oriented to her name and knows she is in the hospital/forgetful/appears in no acute distress and speech is normal/generalized weakness and deconditioning   Psychiatric:         Mood and Affect: Mood normal.         Thought Content: Thought content normal.         Judgment: Judgment normal.         Relevant Results  .  Results for orders placed or performed during the hospital encounter of 08/29/24 (from the past 24 hour(s))   POCT GLUCOSE   Result Value Ref Range    POCT Glucose 144 (H) 74 - 99 mg/dL   POCT GLUCOSE   Result Value Ref Range    POCT Glucose 174 (H) 74 - 99 mg/dL   POCT GLUCOSE   Result Value Ref Range    POCT Glucose 195 (H) 74 - 99 mg/dL   POCT GLUCOSE   Result Value Ref Range    POCT Glucose 218 (H) 74 - 99 mg/dL         Scheduled medications  enoxaparin, 40 mg, subcutaneous, q24h  insulin lispro, 0-5 Units, subcutaneous, TID      Continuous medications     PRN medications  PRN medications: acetaminophen, dextrose, dextrose, glucagon, glucagon, morphine           Assessment/Plan                  Assessment & Plan  Hyponatremia    1./Generalized weakness/poor p.o. intake/increased confusion/failure to thrive and worsening overall condition/CT scan of the brain without IV contrast 8/29/2024 showed moderate age-related degenerative changes/moderate ventriculomegaly and atrophy and no infarcts or bleeds/patient is DNR CC and has been and their good friend Jaci Verduzco who I talked to earlier today over the phone want hospice and are requesting hosp[facility] with [facility].  Hospice consultation will be obtained.   and family friend clearly understand her poor prognosis and do not want to pursue any further aggressive testing or prolong her suffering.  Patient is  taking pleasure foods.  Her p.o. intake is poor to fair.  2./History of colon cancer with metastasis to lungs//CT scan of the chest and abdomen pelvis done in the ED yesterday showed bilateral diffuse lung metastatic disease and no bowel obstruction.  Patient has been seen by oncology team/ states that they stopped her chemo treatment more than a year ago.  They have not been following with oncology and  states that they had stopped her treatments over a year ago.  Patient most likely has worsening and recurrence of her metastatic colon cancer and overall failure to thrive and worsening condition and and she is DNR CC and  is agreeable for hospice care and patient has been seen by palliative care team.  Patient is not in any pain and encourage p.o. food and social service consultation has been obtained for further placement into hospice facility when bed available.   clearly understands that she has poor prognosis.  #3/generalized weakness/debility and overall worsening condition and failure to thrive./UA on admission had negative nitrites and leukocyte esterase and turbid urine/patient is afebrile and WBC count is normal.  Urine culture is pending.  #4/Hx of Dementia/forgetfulness/confusion and no overt signs of infection.    -Pain medication as needed    -Dietitian recommendations       #5/ Diarrhea/is better.    #6/Electrolyte imbalance/hyponatremia/  Hypomagnesemia/sodium is 131/  -IV fluids, patient's  endorses poor oral intake by patient x 3 days- likely due to dehydration  -Magnesium replaced in the ED/and magnesium has improved to 1.6/  -Monitor with BMP in the a.m.    /#8/chronic anemia  -Hemoglobin 8.4 (7.8 on 10/5/2021)  -Patient denies any signs of overt bleeding. Monitor for signs of bleeding, monitor with CBC in a.m.    8/Hypoalbuminemia/poor p.o. intake/encourage p.o. food   -Dietitian consult and recommendations appreciated  -Wound prevention    #9  Hypertension/blood pressure is stable.  #10 Hyperlipidemia  #11 /T2DM  -SSI  -Hypoglycemic protocol  -    12/DVT prophylaxis  -Lovenox  -SCDs  #13/Advance care planning discussed with patient's  yesterday and with family friend over the phone Jaci today.  Both are in agreement for hospice consultation and hospice care with [facility].  Case also discussed with  Katie and will start the process of placement into hospice facility and discharged to hospice under hospice care.  Patient has been seen by palliative care team also.  Overall prognosis is poor.  Total time spent 40 minutes/time spent on patient interaction/counseling/assessment and plan and documentation and discussing plan of care with family friend Jaci over the phone.            Hernan Cuevas MD

## 2024-08-31 NOTE — ASSESSMENT & PLAN NOTE
1./Generalized weakness/poor p.o. intake/increased confusion/failure to thrive and worsening overall condition/CT scan of the brain without IV contrast 8/29/2024 showed moderate age-related degenerative changes/moderate ventriculomegaly and atrophy and no infarcts or bleeds/patient wants DNR CC and hospice care and no further aggressive treatments.  2./History of colon cancer with metastasis to lungs//CT scan of the chest and abdomen pelvis done in the ED yesterday showed bilateral diffuse lung metastatic disease and no bowel obstruction.  Patient has been seen by oncology team/ states that they stopped her chemo treatment more than a year ago.  They have not been following with oncology and they follow with the physician Dr. D'Amico who comes to their home and I will call her tomorrow to get more information.  Patient most likely has worsening and recurrence of her metastatic colon cancer and overall failure to thrive and worsening condition and and she is DNR CC and  is agreeable for hospice care and patient has been seen by palliative care team.  Patient is not in any pain and encourage p.o. food and social service consultation has been obtained for further placement into hospice facility when bed available.   clearly understands that she has poor prognosis.  #3/generalized weakness/debility and overall worsening condition and failure to thrive./UA on admission had negative nitrites and leukocyte esterase and turbid urine/patient is afebrile and WBC count is normal.  Urine culture is pending.  #4/Hx of Dementia/forgetfulness/confusion and no overt signs of infection.    -Pain medication as needed    -Dietitian recommendations       #5/diarrhea/is better.    #6/Electrolyte imbalance/hyponatremia/  Hypomagnesemia/sodium is 131/  -IV fluids, patient's  endorses poor oral intake by patient x 3 days- likely due to dehydration  -Magnesium replaced in the ED/and magnesium has improved to  1.6/  -Monitor with BMP in the a.m.    /#8/chronic anemia  -Hemoglobin 8.4 (7.8 on 10/5/2021)  -Patient denies any signs of overt bleeding. Monitor for signs of bleeding, monitor with CBC in a.m.    8/Hypoalbuminemia/poor p.o. intake/encourage p.o. food   -Dietitian consult and recommendations appreciated  -Wound prevention    #9 hypertension/blood pressure is stable.  #10 hyperlipidemia  #11 /T2DM  -SSI  -Hypoglycemic protocol  -Diabetic diet    DVT prophylaxis  -Lovenox  -SCDs

## 2024-08-31 NOTE — PROGRESS NOTES
Mehnaz Nunez is a 74 y.o. female on day 1 of admission presenting with Hyponatremia.      Subjective   Patient seen and examined by me.  Patient is resting in bed and her  Aidan is present in the room.  Case discussed with him.  Patient is awake alert oriented to her name but is confused and forgetful.  As per nursing she did eat some breakfast and lunch and is awaiting her dinner.  She appears in no acute distress.  Patient has been seen by oncology team/palliative care team and I appreciate their consultations.  Vitals and labs noted.  Case discussed with the nursing at bedside.  Patient is DNR CC and  is agreeable for hospice and placement into hospice facility when bed available.  History obtained from the  in length.       Objective     Last Recorded Vitals  /61 (BP Location: Right arm, Patient Position: Lying)   Pulse 78   Temp 37.2 °C (99 °F) (Temporal)   Resp 18   Wt 89.6 kg (197 lb 8.5 oz)   SpO2 98%   Intake/Output last 3 Shifts:    Intake/Output Summary (Last 24 hours) at 8/30/2024 2238  Last data filed at 8/30/2024 0003  Gross per 24 hour   Intake 370 ml   Output --   Net 370 ml       Admission Weight  Weight: 89.6 kg (197 lb 8.5 oz) (08/29/24 1941)    Daily Weight  08/29/24 : 89.6 kg (197 lb 8.5 oz)    Image Results  ECG 12 lead  Sinus rhythm with 1st degree AV block with frequent Premature ventricular complexes  Left axis deviation  Low voltage QRS  Inferior infarct , age undetermined  Cannot rule out Anterior infarct , age undetermined  Abnormal ECG  When compared with ECG of 04-OCT-2021 11:29,  PREVIOUS ECG IS PRESENT  See ED provider note for full interpretation and clinical correlation  Confirmed by Denise Larose ([id number]) on 8/30/2024 2:55:28 PM  Lower extremity venous duplex bilateral             [address].[place], [place] [address]  Tel [phone] and Fax [phone]       Vascular Lab Report  [facility] LOWER EXTREMITY VENOUS DUPLEX  BILATERAL       Patient Name:      DEMETAR TRAN        Reading Physician:  [id number] Jennifer Maradiaga MD, RPVI  Study Date:        8/29/2024           Ordering Physician: [id number] JOCELIN F ANNIE  MRN/PID:           [id number]            Technologist:       Padma HERNANDEZ  Accession#:        [id number]        Technologist 2:  Date of Birth/Age: 1950 / 74 years Encounter#:         [id number]  Gender:            F  Admission Status:  Emergency           Location Performed: [facility] [place]       Diagnosis/ICD: Localized (leg) edema-R60.0  Indication:    Limb edema  CPT Codes:     54994 Peripheral venous duplex scan for DVT complete       Patient History Cancer.       CONCLUSIONS:  Right popliteal fossa structure (details below); follow up suggested.    Right Lower Venous: No evidence of acute deep vein thrombus visualized in the right lower extremity. Additional Findings; Heterogeneous, non vascularized structure in the right pop fossa at 3.6cm x 1.4 cm. Findings could be consistent with a complex or rupturee cyst, however, clinical correlation and future follow-up recommended as its appearance is not entirely classical for a popliteal cyst.     Left Lower Venous: No evidence of acute deep vein thrombus visualized in the left lower extremity.     Imaging & Doppler Findings:     Right                 Compressible Thrombus        Flow  Distal External Iliac     Yes        None  CFV                       Yes        None   Spontaneous/Phasic  PFV                       Yes        None  FV Proximal               Yes        None   Spontaneous/Phasic  FV Mid                    Yes        None  FV Distal                 Yes        None  Popliteal                 Yes        None   Spontaneous/Phasic  Peroneal                  Yes        None  PTV                       Yes        None       Left                  Compress Thrombus        Flow  Distal External Iliac    Yes      None  CFV                     Yes      None   Spontaneous/Phasic  PFV                     Yes      None  FV Proximal             Yes      None   Spontaneous/Phasic  FV Mid                  Yes      None  FV Distal               Yes      None  Popliteal               Yes      None   Spontaneous/Phasic  Peroneal                Yes      None  PTV                     Yes      None       [id number] Jennifer Maradiaga MD, NAREN  Electronically signed by [id number] NAREN Baker MD on 8/30/2024 at 9:59:11 AM       ** Final **      Physical Exam  Vitals and nursing note reviewed.   Constitutional:       General: She is not in acute distress.     Appearance: Normal appearance. She is obese. She is not ill-appearing or toxic-appearing.   HENT:      Head: Normocephalic and atraumatic.      Right Ear: Tympanic membrane and ear canal normal. There is no impacted cerumen.      Left Ear: Tympanic membrane, ear canal and external ear normal.      Nose: Nose normal. No congestion or rhinorrhea.      Mouth/Throat:      Pharynx: Oropharynx is clear. No oropharyngeal exudate or posterior oropharyngeal erythema.   Eyes:      General: No scleral icterus.        Right eye: No discharge.         Left eye: No discharge.      Extraocular Movements: Extraocular movements intact.      Conjunctiva/sclera: Conjunctivae normal.      Pupils: Pupils are equal, round, and reactive to light.   Neck:      Vascular: No carotid bruit.   Cardiovascular:      Rate and Rhythm: Normal rate and regular rhythm.      Pulses: Normal pulses.      Heart sounds: Normal heart sounds. No murmur heard.     No gallop.   Pulmonary:      Effort: Pulmonary effort is normal. No respiratory distress.      Breath sounds: Normal breath sounds. No wheezing, rhonchi or rales.   Abdominal:      General: Abdomen is flat. Bowel sounds are normal. There is no distension.      Palpations: Abdomen is soft. There is no mass.      Tenderness: There is no abdominal tenderness. There  is no right CVA tenderness, left CVA tenderness, guarding or rebound.      Hernia: No hernia is present.   Musculoskeletal:         General: No swelling or tenderness. Normal range of motion.      Cervical back: Normal range of motion and neck supple. No rigidity.      Right lower leg: No edema.      Left lower leg: No edema.   Lymphadenopathy:      Cervical: No cervical adenopathy.   Skin:     General: Skin is warm.      Coloration: Skin is not jaundiced.      Findings: No erythema or rash.   Neurological:      General: No focal deficit present.      Mental Status: She is alert. Mental status is at baseline. She is confused.      Sensory: No sensory deficit.      Motor: Weakness present.      Gait: Gait normal.      Deep Tendon Reflexes: Reflexes normal.      Comments: Patient is awake alert oriented to her name/no focal deficits/generalized weakness of the legs/   Psychiatric:         Mood and Affect: Mood normal.         Thought Content: Thought content normal.         Judgment: Judgment normal.         Relevant Results      Current Facility-Administered Medications:     acetaminophen (Tylenol) tablet 650 mg, 650 mg, oral, q4h PRN, Anabelle F Null, PA-C    dextrose 50 % injection 12.5 g, 12.5 g, intravenous, q15 min PRN, Anabelle F Null, PA-C    dextrose 50 % injection 25 g, 25 g, intravenous, q15 min PRN, Anabelle F Null, PA-C    enoxaparin (Lovenox) syringe 40 mg, 40 mg, subcutaneous, q24h, Anabelle F Null, PA-C, 40 mg at 08/30/24 2026    glucagon (Glucagen) injection 1 mg, 1 mg, intramuscular, q15 min PRN, Anabelle F Null, PA-C    glucagon (Glucagen) injection 1 mg, 1 mg, intramuscular, q15 min PRN, Anabelle F Null, PA-C    insulin lispro (HumaLOG) injection 0-5 Units, 0-5 Units, subcutaneous, TID, Anabelle F Null, PA-C, 2 Units at 08/30/24 1239    morphine injection 2 mg, 2 mg, intravenous, q4h PRN, Anabelle F Null, PA-C   Results for orders placed or performed during the hospital encounter of 08/29/24 (from the past 24 hour(s))    POCT GLUCOSE   Result Value Ref Range    POCT Glucose 150 (H) 74 - 99 mg/dL   C. difficile, PCR    Specimen: Stool   Result Value Ref Range    C. difficile, PCR Not Detected Not Detected   Urinalysis with Reflex Culture and Microscopic   Result Value Ref Range    Color, Urine Orange (N) Light-Yellow, Yellow, Dark-Yellow    Appearance, Urine Turbid (N) Clear    Specific Gravity, Urine 1.021 1.005 - 1.035    pH, Urine 6.5 5.0, 5.5, 6.0, 6.5, 7.0, 7.5, 8.0    Protein, Urine 70 (1+) (A) NEGATIVE, 10 (TRACE), 20 (TRACE) mg/dL    Glucose, Urine 30 (TRACE) (A) Normal mg/dL    Blood, Urine OVER (3+) (A) NEGATIVE    Ketones, Urine NEGATIVE NEGATIVE mg/dL    Bilirubin, Urine NEGATIVE NEGATIVE    Urobilinogen, Urine Normal Normal mg/dL    Nitrite, Urine NEGATIVE NEGATIVE    Leukocyte Esterase, Urine 500 Silverio/µL (A) NEGATIVE   Extra Urine Gray Tube   Result Value Ref Range    Extra Tube Hold for add-ons.    Microscopic Only, Urine   Result Value Ref Range    WBC, Urine 21-50 (A) 1-5, NONE /HPF    WBC Clumps, Urine RARE Reference range not established. /HPF    RBC, Urine 11-20 (A) NONE, 1-2, 3-5 /HPF    Bacteria, Urine 4+ (A) NONE SEEN /HPF    Mucus, Urine FEW Reference range not established. /LPF    Calcium Oxalate Crystals, Urine 4+ (A) NONE, 1+ /HPF   CBC   Result Value Ref Range    WBC 7.4 4.4 - 11.3 x10*3/uL    nRBC 0.0 0.0 - 0.0 /100 WBCs    RBC 3.12 (L) 4.00 - 5.20 x10*6/uL    Hemoglobin 8.4 (L) 12.0 - 16.0 g/dL    Hematocrit 27.0 (L) 36.0 - 46.0 %    MCV 87 80 - 100 fL    MCH 26.9 26.0 - 34.0 pg    MCHC 31.1 (L) 32.0 - 36.0 g/dL    RDW 14.1 11.5 - 14.5 %    Platelets 202 150 - 450 x10*3/uL   Basic metabolic panel   Result Value Ref Range    Glucose 133 (H) 74 - 99 mg/dL    Sodium 131 (L) 136 - 145 mmol/L    Potassium 4.1 3.5 - 5.3 mmol/L    Chloride 99 98 - 107 mmol/L    Bicarbonate 25 21 - 32 mmol/L    Anion Gap 11 10 - 20 mmol/L    Urea Nitrogen 19 6 - 23 mg/dL    Creatinine 0.96 0.50 - 1.05 mg/dL    eGFR 62 >60  mL/min/1.73m*2    Calcium 8.3 (L) 8.6 - 10.3 mg/dL   Magnesium   Result Value Ref Range    Magnesium 1.66 1.60 - 2.40 mg/dL   SST TOP   Result Value Ref Range    Extra Tube Hold for add-ons.    POCT GLUCOSE   Result Value Ref Range    POCT Glucose 127 (H) 74 - 99 mg/dL   POCT GLUCOSE   Result Value Ref Range    POCT Glucose 218 (H) 74 - 99 mg/dL   POCT GLUCOSE   Result Value Ref Range    POCT Glucose 146 (H) 74 - 99 mg/dL   POCT GLUCOSE   Result Value Ref Range    POCT Glucose 183 (H) 74 - 99 mg/dL           Assessment/Plan                  Assessment & Plan  Hyponatremia    1./Generalized weakness/poor p.o. intake/increased confusion/failure to thrive and worsening overall condition/CT scan of the brain without IV contrast 8/29/2024 showed moderate age-related degenerative changes/moderate ventriculomegaly and atrophy and no infarcts or bleeds/patient wants DNR CC and hospice care and no further aggressive treatments.  2./History of colon cancer with metastasis to lungs//CT scan of the chest and abdomen pelvis done in the ED yesterday showed bilateral diffuse lung metastatic disease and no bowel obstruction.  Patient has been seen by oncology team/ states that they stopped her chemo treatment more than a year ago.  They have not been following with oncology and they follow with the physician Dr. D'Amico who comes to their home and I will call her tomorrow to get more information.  Patient most likely has worsening and recurrence of her metastatic colon cancer and overall failure to thrive and worsening condition and and she is DNR CC and  is agreeable for hospice care and patient has been seen by palliative care team.  Patient is not in any pain and encourage p.o. food and social service consultation has been obtained for further placement into hospice facility when bed available.   clearly understands that she has poor prognosis.  #3/generalized weakness/debility and overall worsening  condition and failure to thrive./UA on admission had negative nitrites and leukocyte esterase and turbid urine/patient is afebrile and WBC count is normal.  Urine culture is pending.  #4/Hx of Dementia/forgetfulness/confusion and no overt signs of infection.    -Pain medication as needed    -Dietitian recommendations       #5/diarrhea/is better.    #6/Electrolyte imbalance/hyponatremia/  Hypomagnesemia/sodium is 131/  -IV fluids, patient's  endorses poor oral intake by patient x 3 days- likely due to dehydration  -Magnesium replaced in the ED/and magnesium has improved to 1.6/  -Monitor with BMP in the a.m.    /#8/chronic anemia  -Hemoglobin 8.4 (7.8 on 10/5/2021)  -Patient denies any signs of overt bleeding. Monitor for signs of bleeding, monitor with CBC in a.m.    8/Hypoalbuminemia/poor p.o. intake/encourage p.o. food   -Dietitian consult and recommendations appreciated  -Wound prevention    #9 hypertension/blood pressure is stable.  #10 hyperlipidemia  #11 /T2DM  -SSI  -Hypoglycemic protocol  -Diabetic diet    DVT prophylaxis  -Lovenox  -SCDs  Medications reconciled.  Case fully discussed with the  at bedside.  Patient is DNR CC and will be discharged under hospice when bed available.  Continue palliative care.  Overall prognosis is guarded.  Total time spent 36 minutes/time spent on previsit planning/patient interaction/counseling and further discussing plan of care with /assessment and plan and documentation.              Hernan Cuevas MD

## 2024-08-31 NOTE — CARE PLAN
The patient's goals for the shift include comfort and safety    The clinical goals for the shift include Safety      Problem: Pain - Adult  Goal: Verbalizes/displays adequate comfort level or baseline comfort level  Outcome: Progressing     Problem: Safety - Adult  Goal: Free from fall injury  Outcome: Progressing     Problem: Discharge Planning  Goal: Discharge to home or other facility with appropriate resources  Outcome: Progressing     Problem: Chronic Conditions and Co-morbidities  Goal: Patient's chronic conditions and co-morbidity symptoms are monitored and maintained or improved  Outcome: Progressing     Problem: Skin  Goal: Participates in plan/prevention/treatment measures  Outcome: Progressing  Flowsheets (Taken 8/30/2024 2032)  Participates in plan/prevention/treatment measures: Elevate heels  Goal: Prevent/manage excess moisture  Outcome: Progressing  Flowsheets (Taken 8/30/2024 2032)  Prevent/manage excess moisture:   Moisturize dry skin   Cleanse incontinence/protect with barrier cream  Goal: Prevent/minimize sheer/friction injuries  Outcome: Progressing  Flowsheets (Taken 8/30/2024 2032)  Prevent/minimize sheer/friction injuries:   Turn/reposition every 2 hours/use positioning/transfer devices   Use pull sheet   HOB 30 degrees or less  Goal: Promote/optimize nutrition  Outcome: Progressing  Flowsheets (Taken 8/30/2024 2032)  Promote/optimize nutrition:   Consume > 50% meals/supplements   Assist with feeding   Offer water/supplements/favorite foods   Monitor/record intake including meals

## 2024-08-31 NOTE — ASSESSMENT & PLAN NOTE
1./Generalized weakness/poor p.o. intake/increased confusion/failure to thrive and worsening overall condition/CT scan of the brain without IV contrast 8/29/2024 showed moderate age-related degenerative changes/moderate ventriculomegaly and atrophy and no infarcts or bleeds/patient is DNR CC and has been and their good friend Jaci Verduzco who I talked to earlier today over the phone want hospice and are requesting hospice with [facility].  Hospice consultation will be obtained.   and family friend clearly understand her poor prognosis and do not want to pursue any further aggressive testing or prolong her suffering.  Patient is taking pleasure foods.  Her p.o. intake is poor to fair.  2./History of colon cancer with metastasis to lungs//CT scan of the chest and abdomen pelvis done in the ED yesterday showed bilateral diffuse lung metastatic disease and no bowel obstruction.  Patient has been seen by oncology team/ states that they stopped her chemo treatment more than a year ago.  They have not been following with oncology and  states that they had stopped her treatments over a year ago.  Patient most likely has worsening and recurrence of her metastatic colon cancer and overall failure to thrive and worsening condition and and she is DNR CC and  is agreeable for hospice care and patient has been seen by palliative care team.  Patient is not in any pain and encourage p.o. food and social service consultation has been obtained for further placement into hospice facility when bed available.   clearly understands that she has poor prognosis.  #3/generalized weakness/debility and overall worsening condition and failure to thrive./UA on admission had negative nitrites and leukocyte esterase and turbid urine/patient is afebrile and WBC count is normal.  Urine culture is pending.  #4/Hx of Dementia/forgetfulness/confusion and no overt signs of infection.    -Pain medication as  needed    -Dietitian recommendations       #5/ Diarrhea/is better.    #6/Electrolyte imbalance/hyponatremia/  Hypomagnesemia/sodium is 131/  -IV fluids, patient's  endorses poor oral intake by patient x 3 days- likely due to dehydration  -Magnesium replaced in the ED/and magnesium has improved to 1.6/  -Monitor with BMP in the a.m.    /#8/chronic anemia  -Hemoglobin 8.4 (7.8 on 10/5/2021)  -Patient denies any signs of overt bleeding. Monitor for signs of bleeding, monitor with CBC in a.m.    8/Hypoalbuminemia/poor p.o. intake/encourage p.o. food   -Dietitian consult and recommendations appreciated  -Wound prevention    #9 Hypertension/blood pressure is stable.  #10 Hyperlipidemia  #11 /T2DM  -SSI  -Hypoglycemic protocol  -    12/DVT prophylaxis  -Lovenox  -SCDs

## 2024-09-01 VITALS
HEIGHT: 63 IN | SYSTOLIC BLOOD PRESSURE: 112 MMHG | HEART RATE: 77 BPM | TEMPERATURE: 98.4 F | RESPIRATION RATE: 16 BRPM | BODY MASS INDEX: 35 KG/M2 | OXYGEN SATURATION: 99 % | DIASTOLIC BLOOD PRESSURE: 52 MMHG | WEIGHT: 197.53 LBS

## 2024-09-01 LAB
GLUCOSE BLD MANUAL STRIP-MCNC: 163 MG/DL (ref 74–99)
GLUCOSE BLD MANUAL STRIP-MCNC: 170 MG/DL (ref 74–99)
GLUCOSE BLD MANUAL STRIP-MCNC: 234 MG/DL (ref 74–99)
GLUCOSE BLD MANUAL STRIP-MCNC: 251 MG/DL (ref 74–99)

## 2024-09-01 PROCEDURE — 1100000001 HC PRIVATE ROOM DAILY

## 2024-09-01 PROCEDURE — 82947 ASSAY GLUCOSE BLOOD QUANT: CPT

## 2024-09-01 PROCEDURE — 2500000002 HC RX 250 W HCPCS SELF ADMINISTERED DRUGS (ALT 637 FOR MEDICARE OP, ALT 636 FOR OP/ED)

## 2024-09-01 PROCEDURE — 2500000004 HC RX 250 GENERAL PHARMACY W/ HCPCS (ALT 636 FOR OP/ED)

## 2024-09-01 ASSESSMENT — PAIN SCALES - GENERAL
PAINLEVEL_OUTOF10: 7
PAINLEVEL_OUTOF10: 7
PAINLEVEL_OUTOF10: 2
PAINLEVEL_OUTOF10: 0 - NO PAIN

## 2024-09-01 ASSESSMENT — PAIN DESCRIPTION - DESCRIPTORS: DESCRIPTORS: DISCOMFORT

## 2024-09-01 ASSESSMENT — PAIN - FUNCTIONAL ASSESSMENT
PAIN_FUNCTIONAL_ASSESSMENT: 0-10
PAIN_FUNCTIONAL_ASSESSMENT: 0-10

## 2024-09-01 ASSESSMENT — PAIN SCALES - PAIN ASSESSMENT IN ADVANCED DEMENTIA (PAINAD): TOTALSCORE: MEDICATION (SEE MAR)

## 2024-09-01 ASSESSMENT — PAIN DESCRIPTION - LOCATION: LOCATION: GENERALIZED

## 2024-09-01 NOTE — PROGRESS NOTES
Demetra Tran is a 74 y.o. female on day 3 of admission presenting with Hyponatremia.      Subjective   Patient seen and examined by me.  Patient is resting in bed and is pleasant and awake and alert x oriented 2.  Patient denies any pain and appears in no acute distress.  She is afebrile.  Blood pressure is stable.  Patient is taking p.o. food and fair intake.       Objective     Last Recorded Vitals  /63 (BP Location: Right arm, Patient Position: Sitting)   Pulse 73   Temp 35.5 °C (95.9 °F) (Temporal)   Resp 16   Wt 89.6 kg (197 lb 8.5 oz)   SpO2 92%   Intake/Output last 3 Shifts:    Intake/Output Summary (Last 24 hours) at 9/1/2024 1403  Last data filed at 9/1/2024 1245  Gross per 24 hour   Intake 45 ml   Output 300 ml   Net -255 ml       Admission Weight  Weight: 89.6 kg (197 lb 8.5 oz) (08/29/24 1941)    Daily Weight  08/29/24 : 89.6 kg (197 lb 8.5 oz)    Image Results  ECG 12 lead  Sinus rhythm with 1st degree AV block with frequent Premature ventricular complexes  Left axis deviation  Low voltage QRS  Inferior infarct , age undetermined  Cannot rule out Anterior infarct , age undetermined  Abnormal ECG  When compared with ECG of 04-OCT-2021 11:29,  PREVIOUS ECG IS PRESENT  See ED provider note for full interpretation and clinical correlation  Confirmed by Denise Larose ([id number]) on 8/30/2024 2:55:28 PM  Lower extremity venous duplex bilateral             [place] [address]  Tel [phone] and Fax [phone]       Vascular Lab Report  [facility] LOWER EXTREMITY VENOUS DUPLEX BILATERAL       Patient Name:      DEMETRA TRAN        Reading Physician:  [id number] Jennifer Maradiaga MD, RPVI  Study Date:        8/29/2024           Ordering Physician: [id number] VALENCIA  MRN/PID:           [id number]            Technologist:       Padma HERNANDEZ  Accession#:        [id number]        Technologist 2:  Date  of Birth/Age: 1950 / 74 years Encounter#:         [id number]  Gender:            F  Admission Status:  Emergency           Location Performed: [facility]       Diagnosis/ICD: Localized (leg) edema-R60.0  Indication:    Limb edema  CPT Codes:     61921 Peripheral venous duplex scan for DVT complete       Patient History Cancer.       CONCLUSIONS:  Right popliteal fossa structure (details below); follow up suggested.    Right Lower Venous: No evidence of acute deep vein thrombus visualized in the right lower extremity. Additional Findings; Heterogeneous, non vascularized structure in the right pop fossa at 3.6cm x 1.4 cm. Findings could be consistent with a complex or rupturee cyst, however, clinical correlation and future follow-up recommended as its appearance is not entirely classical for a popliteal cyst.     Left Lower Venous: No evidence of acute deep vein thrombus visualized in the left lower extremity.     Imaging & Doppler Findings:     Right                 Compressible Thrombus        Flow  Distal External Iliac     Yes        None  CFV                       Yes        None   Spontaneous/Phasic  PFV                       Yes        None  FV Proximal               Yes        None   Spontaneous/Phasic  FV Mid                    Yes        None  FV Distal                 Yes        None  Popliteal                 Yes        None   Spontaneous/Phasic  Peroneal                  Yes        None  PTV                       Yes        None       Left                  Compress Thrombus        Flow  Distal External Iliac   Yes      None  CFV                     Yes      None   Spontaneous/Phasic  PFV                     Yes      None  FV Proximal             Yes      None   Spontaneous/Phasic  FV Mid                  Yes      None  FV Distal               Yes      None  Popliteal               Yes      None   Spontaneous/Phasic  Peroneal                Yes      None  PTV                     Yes       None       [id number] Jennifer Maradiaga MD, NAREN  Electronically signed by [id number] NAREN Baker MD on 8/30/2024 at 9:59:11 AM       ** Final **      Physical Exam  Vitals and nursing note reviewed.   Constitutional:       General: She is not in acute distress.     Appearance: Normal appearance. She is obese. She is not ill-appearing or toxic-appearing.   HENT:      Head: Normocephalic and atraumatic.      Right Ear: Tympanic membrane and ear canal normal. There is no impacted cerumen.      Left Ear: Tympanic membrane, ear canal and external ear normal.      Nose: Nose normal. No congestion or rhinorrhea.      Mouth/Throat:      Pharynx: Oropharynx is clear. No oropharyngeal exudate or posterior oropharyngeal erythema.   Eyes:      General: No scleral icterus.        Right eye: No discharge.         Left eye: No discharge.      Extraocular Movements: Extraocular movements intact.      Conjunctiva/sclera: Conjunctivae normal.      Pupils: Pupils are equal, round, and reactive to light.   Neck:      Vascular: No carotid bruit.   Cardiovascular:      Rate and Rhythm: Normal rate and regular rhythm.      Pulses: Normal pulses.      Heart sounds: Normal heart sounds. No murmur heard.     No gallop.   Pulmonary:      Effort: Pulmonary effort is normal. No respiratory distress.      Breath sounds: Normal breath sounds. No wheezing, rhonchi or rales.   Abdominal:      General: Abdomen is flat. Bowel sounds are normal. There is no distension.      Palpations: Abdomen is soft. There is no mass.      Tenderness: There is no abdominal tenderness. There is no right CVA tenderness, left CVA tenderness, guarding or rebound.      Hernia: No hernia is present.   Musculoskeletal:         General: No swelling or tenderness. Normal range of motion.      Cervical back: Normal range of motion and neck supple. No rigidity.      Right lower leg: No edema.      Left lower leg: No edema.   Lymphadenopathy:      Cervical: No cervical adenopathy.    Skin:     General: Skin is warm.      Coloration: Skin is not jaundiced.      Findings: No erythema or rash.   Neurological:      General: No focal deficit present.      Mental Status: She is alert. Mental status is at baseline. She is confused.      Cranial Nerves: No dysarthria.      Sensory: No sensory deficit.      Motor: Weakness present.      Gait: Gait normal.      Deep Tendon Reflexes: Reflexes normal.      Comments: Oriented to her name and place/moving all extremities spontaneously/generalized weakness and deconditioning   Psychiatric:         Mood and Affect: Mood normal.         Thought Content: Thought content normal.         Judgment: Judgment normal.         Relevant Results  Results for orders placed or performed during the hospital encounter of 08/29/24 (from the past 96 hour(s))   CBC and Auto Differential   Result Value Ref Range    WBC 10.2 4.4 - 11.3 x10*3/uL    nRBC 0.0 0.0 - 0.0 /100 WBCs    RBC 3.52 (L) 4.00 - 5.20 x10*6/uL    Hemoglobin 9.6 (L) 12.0 - 16.0 g/dL    Hematocrit 30.4 (L) 36.0 - 46.0 %    MCV 86 80 - 100 fL    MCH 27.3 26.0 - 34.0 pg    MCHC 31.6 (L) 32.0 - 36.0 g/dL    RDW 13.9 11.5 - 14.5 %    Platelets 194 150 - 450 x10*3/uL    Neutrophils % 84.3 40.0 - 80.0 %    Immature Granulocytes %, Automated 0.5 0.0 - 0.9 %    Lymphocytes % 7.0 13.0 - 44.0 %    Monocytes % 5.5 2.0 - 10.0 %    Eosinophils % 2.6 0.0 - 6.0 %    Basophils % 0.1 0.0 - 2.0 %    Neutrophils Absolute 8.59 (H) 1.60 - 5.50 x10*3/uL    Immature Granulocytes Absolute, Automated 0.05 0.00 - 0.50 x10*3/uL    Lymphocytes Absolute 0.71 (L) 0.80 - 3.00 x10*3/uL    Monocytes Absolute 0.56 0.05 - 0.80 x10*3/uL    Eosinophils Absolute 0.27 0.00 - 0.40 x10*3/uL    Basophils Absolute 0.01 0.00 - 0.10 x10*3/uL   Comprehensive metabolic panel   Result Value Ref Range    Glucose 184 (H) 74 - 99 mg/dL    Sodium 128 (L) 136 - 145 mmol/L    Potassium 4.1 3.5 - 5.3 mmol/L    Chloride 93 (L) 98 - 107 mmol/L    Bicarbonate 25 21 -  32 mmol/L    Anion Gap 14 10 - 20 mmol/L    Urea Nitrogen 19 6 - 23 mg/dL    Creatinine 0.95 0.50 - 1.05 mg/dL    eGFR 63 >60 mL/min/1.73m*2    Calcium 9.1 8.6 - 10.3 mg/dL    Albumin 3.0 (L) 3.4 - 5.0 g/dL    Alkaline Phosphatase 92 33 - 136 U/L    Total Protein 6.3 (L) 6.4 - 8.2 g/dL    AST 8 (L) 9 - 39 U/L    Bilirubin, Total 0.8 0.0 - 1.2 mg/dL    ALT 3 (L) 7 - 45 U/L   Magnesium   Result Value Ref Range    Magnesium 1.39 (L) 1.60 - 2.40 mg/dL   Lactate   Result Value Ref Range    Lactate 1.2 0.4 - 2.0 mmol/L   Sars-CoV-2 PCR   Result Value Ref Range    Coronavirus 2019, PCR Not Detected Not Detected   ECG 12 lead   Result Value Ref Range    Ventricular Rate 117 BPM    Atrial Rate 92 BPM    OR Interval 352 ms    QRS Duration 62 ms    QT Interval 320 ms    QTC Calculation(Bazett) 446 ms    R Axis -34 degrees    T Axis 31 degrees    QRS Count 20 beats    Q Onset 220 ms    T Offset 380 ms    QTC Fredericia 400 ms   POCT GLUCOSE   Result Value Ref Range    POCT Glucose 130 (H) 74 - 99 mg/dL   POCT GLUCOSE   Result Value Ref Range    POCT Glucose 165 (H) 74 - 99 mg/dL   POCT GLUCOSE   Result Value Ref Range    POCT Glucose 150 (H) 74 - 99 mg/dL   Stool Pathogen Panel, PCR    Specimen: Stool   Result Value Ref Range    Campylobacter Group Not Detected Not Detected    Salmonella species Not Detected Not Detected    Shigella species Not Detected Not Detected    Vibrio Group Not Detected Not Detected    Yersinia Enterocolitica Not Detected Not Detected    Shiga Toxin 1 Not Detected Not Detected    Shiga Toxin 2 Not Detected Not Detected    Norovirus GI/GII Not Detected Not Detected    Rotavirus A Not Detected Not Detected   C. difficile, PCR    Specimen: Stool   Result Value Ref Range    C. difficile, PCR Not Detected Not Detected   Urinalysis with Reflex Culture and Microscopic   Result Value Ref Range    Color, Urine Orange (N) Light-Yellow, Yellow, Dark-Yellow    Appearance, Urine Turbid (N) Clear    Specific Gravity,  Urine 1.021 1.005 - 1.035    pH, Urine 6.5 5.0, 5.5, 6.0, 6.5, 7.0, 7.5, 8.0    Protein, Urine 70 (1+) (A) NEGATIVE, 10 (TRACE), 20 (TRACE) mg/dL    Glucose, Urine 30 (TRACE) (A) Normal mg/dL    Blood, Urine OVER (3+) (A) NEGATIVE    Ketones, Urine NEGATIVE NEGATIVE mg/dL    Bilirubin, Urine NEGATIVE NEGATIVE    Urobilinogen, Urine Normal Normal mg/dL    Nitrite, Urine NEGATIVE NEGATIVE    Leukocyte Esterase, Urine 500 Silverio/µL (A) NEGATIVE   Extra Urine Gray Tube   Result Value Ref Range    Extra Tube Hold for add-ons.    Microscopic Only, Urine   Result Value Ref Range    WBC, Urine 21-50 (A) 1-5, NONE /HPF    WBC Clumps, Urine RARE Reference range not established. /HPF    RBC, Urine 11-20 (A) NONE, 1-2, 3-5 /HPF    Bacteria, Urine 4+ (A) NONE SEEN /HPF    Mucus, Urine FEW Reference range not established. /LPF    Calcium Oxalate Crystals, Urine 4+ (A) NONE, 1+ /HPF   Urine Culture    Specimen: Clean Catch/Voided; Urine   Result Value Ref Range    Urine Culture (A)      Multiple organisms present, probable contamination. Repeat culture if clinically indicated.   CBC   Result Value Ref Range    WBC 7.4 4.4 - 11.3 x10*3/uL    nRBC 0.0 0.0 - 0.0 /100 WBCs    RBC 3.12 (L) 4.00 - 5.20 x10*6/uL    Hemoglobin 8.4 (L) 12.0 - 16.0 g/dL    Hematocrit 27.0 (L) 36.0 - 46.0 %    MCV 87 80 - 100 fL    MCH 26.9 26.0 - 34.0 pg    MCHC 31.1 (L) 32.0 - 36.0 g/dL    RDW 14.1 11.5 - 14.5 %    Platelets 202 150 - 450 x10*3/uL   Basic metabolic panel   Result Value Ref Range    Glucose 133 (H) 74 - 99 mg/dL    Sodium 131 (L) 136 - 145 mmol/L    Potassium 4.1 3.5 - 5.3 mmol/L    Chloride 99 98 - 107 mmol/L    Bicarbonate 25 21 - 32 mmol/L    Anion Gap 11 10 - 20 mmol/L    Urea Nitrogen 19 6 - 23 mg/dL    Creatinine 0.96 0.50 - 1.05 mg/dL    eGFR 62 >60 mL/min/1.73m*2    Calcium 8.3 (L) 8.6 - 10.3 mg/dL   Magnesium   Result Value Ref Range    Magnesium 1.66 1.60 - 2.40 mg/dL   SST TOP   Result Value Ref Range    Extra Tube Hold for add-ons.     POCT GLUCOSE   Result Value Ref Range    POCT Glucose 127 (H) 74 - 99 mg/dL   POCT GLUCOSE   Result Value Ref Range    POCT Glucose 218 (H) 74 - 99 mg/dL   POCT GLUCOSE   Result Value Ref Range    POCT Glucose 146 (H) 74 - 99 mg/dL   POCT GLUCOSE   Result Value Ref Range    POCT Glucose 183 (H) 74 - 99 mg/dL   POCT GLUCOSE   Result Value Ref Range    POCT Glucose 144 (H) 74 - 99 mg/dL   POCT GLUCOSE   Result Value Ref Range    POCT Glucose 174 (H) 74 - 99 mg/dL   POCT GLUCOSE   Result Value Ref Range    POCT Glucose 195 (H) 74 - 99 mg/dL   POCT GLUCOSE   Result Value Ref Range    POCT Glucose 218 (H) 74 - 99 mg/dL   POCT GLUCOSE   Result Value Ref Range    POCT Glucose 170 (H) 74 - 99 mg/dL   POCT GLUCOSE   Result Value Ref Range    POCT Glucose 163 (H) 74 - 99 mg/dL   POCT GLUCOSE   Result Value Ref Range    POCT Glucose 251 (H) 74 - 99 mg/dL      Current Facility-Administered Medications:     acetaminophen (Tylenol) tablet 650 mg, 650 mg, oral, q4h PRN, Anabelle Heard PA-C    dextrose 50 % injection 12.5 g, 12.5 g, intravenous, q15 min PRN, Anabelle F Null, PA-C    dextrose 50 % injection 25 g, 25 g, intravenous, q15 min PRN, Anabelle F Félix, PA-C    enoxaparin (Lovenox) syringe 40 mg, 40 mg, subcutaneous, q24h, Anabelle Heard, PA-C, 40 mg at 08/31/24 1930    glucagon (Glucagen) injection 1 mg, 1 mg, intramuscular, q15 min PRN, Anabelle F Félix, PA-C    glucagon (Glucagen) injection 1 mg, 1 mg, intramuscular, q15 min PRN, Anabelle F Félix, PA-C    insulin lispro (HumaLOG) injection 0-5 Units, 0-5 Units, subcutaneous, TID, Anabelle F Félix PA-C, 2 Units at 09/01/24 1737    morphine injection 2 mg, 2 mg, intravenous, q4h PRN, Anabelle Heard, PA-C, 2 mg at 09/01/24 1240            Assessment/Plan                  Assessment & Plan  Hyponatremia    1./Generalized weakness deconditioning/debility/confusion poor to fair p.o. intake/intake/increased confusion/failure to thrive and worsening overall condition/CT scan of the brain without IV  contrast on admission 8/29/2024 showed moderate age-related degenerative changes/moderate ventriculomegaly and atrophy and no infarcts or bleeds/patient is DNR ml/hospice and  and family have requested [facility] hospice.  Will await for hospice team to evaluate for placement.  Hospice consultation has been be obtained.   Aidan and family friend clearly understand her poor prognosis and do not want to pursue any further aggressive testing or prolong her suffering.  Patient is taking pleasure foods.  Her p.o. intake is poor to fair.  2./Hx  of colon cancer with metastasis to lungs//CT scan of the chest and abdomen pelvis done in the ED yesterday showed bilateral diffuse lung metastatic disease and no bowel obstruction.  Patient has been seen by oncology team/ states that they stopped her chemo treatment more than a year ago.  They have not been following with oncology and  states that they had stopped her treatments over a year ago.  Patient most likely has worsening and recurrence of her metastatic colon cancer and overall failure to thrive and worsening condition and and she is DNR ml/hospice and  is agreeable for hospice care in a facility and patient has been seen by palliative care team.  Patient is not in any pain and encourage p.o. food and social service consultation has been obtained for further placement into hospice facility when bed available.   clearly understands that she has poor prognosis.  #4/Hx of #3/dementia/forgetfulness/confusion and no overt signs of infection.  Patient is pleasantly confused and at her baseline.    -Pain medication as needed    -Dietitian recommendations       #5/ Diarrhea/has resolved.    #6/Electrolyte imbalance/hyponatremia/  Hypomagnesemia/sodium is 131/  -IV fluids, patient's  endorses poor oral intake by patient x 3 days- likely due to dehydration  -Magnesium replaced in the ED/and magnesium has improved to 1.6/  -Monitor  with BMP in the a.m.    /#8/chronic anemia  -Hemoglobin 8.4 (7.8 on 10/5/2021)  -Patient denies any signs of overt bleeding. Monitor for signs of bleeding, monitor with CBC in a.m.    8/Hypoalbuminemia/poor p.o. intake/encourage p.o. food   -Dietitian consult and recommendations appreciated  -Wound prevention    #9 Hypertension/blood pressure is stable.  #10 Hyperlipidemia  #11 /T2DM/  -SSI  -Blood sugars noted.  Patient noted remains on lispro insulin/sliding scale.  -    12/DVT prophylaxis  -Lovenox  -SCDs    #13/discharge planning/patient is DNR CC and hospice consultation has been obtained.  Patient will be discharged to hospice facility facility when bed available.  Total time spent 20 minutes/time spent on patient interaction/counseling/assessment and plan and documentation.            Hernan Cuevas MD

## 2024-09-01 NOTE — ASSESSMENT & PLAN NOTE
1./Generalized weakness deconditioning/debility/confusion poor to fair p.o. intake/intake/increased confusion/failure to thrive and worsening overall condition/CT scan of the brain without IV contrast on admission 8/29/2024 showed moderate age-related degenerative changes/moderate ventriculomegaly and atrophy and no infarcts or bleeds/patient is DNR ml/hospice and  and family have requested [facility] hospice.  Will await for hospice team to evaluate for placement.  Hospice consultation has been be obtained.   Aidan and family friend clearly understand her poor prognosis and do not want to pursue any further aggressive testing or prolong her suffering.  Patient is taking pleasure foods.  Her p.o. intake is poor to fair.  2./Hx  of colon cancer with metastasis to lungs//CT scan of the chest and abdomen pelvis done in the ED yesterday showed bilateral diffuse lung metastatic disease and no bowel obstruction.  Patient has been seen by oncology team/ states that they stopped her chemo treatment more than a year ago.  They have not been following with oncology and  states that they had stopped her treatments over a year ago.  Patient most likely has worsening and recurrence of her metastatic colon cancer and overall failure to thrive and worsening condition and and she is DNR ml/hospice and  is agreeable for hospice care in a facility and patient has been seen by palliative care team.  Patient is not in any pain and encourage p.o. food and social service consultation has been obtained for further placement into hospice facility when bed available.   clearly understands that she has poor prognosis.  #4/Hx of #3/dementia/forgetfulness/confusion and no overt signs of infection.  Patient is pleasantly confused and at her baseline.    -Pain medication as needed    -Dietitian recommendations       #5/ Diarrhea/has resolved.    #6/Electrolyte  imbalance/hyponatremia/  Hypomagnesemia/sodium is 131/  -IV fluids, patient's  endorses poor oral intake by patient x 3 days- likely due to dehydration  -Magnesium replaced in the ED/and magnesium has improved to 1.6/  -Monitor with BMP in the a.m.    /#8/chronic anemia  -Hemoglobin 8.4 (7.8 on 10/5/2021)  -Patient denies any signs of overt bleeding. Monitor for signs of bleeding, monitor with CBC in a.m.    8/Hypoalbuminemia/poor p.o. intake/encourage p.o. food   -Dietitian consult and recommendations appreciated  -Wound prevention    #9 Hypertension/blood pressure is stable.  #10 Hyperlipidemia  #11 /T2DM/  -SSI  -Blood sugars noted.  Patient noted remains on lispro insulin/sliding scale.  -    12/DVT prophylaxis  -Lovenox  -SCDs

## 2024-09-02 LAB
GLUCOSE BLD MANUAL STRIP-MCNC: 150 MG/DL (ref 74–99)
GLUCOSE BLD MANUAL STRIP-MCNC: 161 MG/DL (ref 74–99)
GLUCOSE BLD MANUAL STRIP-MCNC: 227 MG/DL (ref 74–99)
GLUCOSE BLD MANUAL STRIP-MCNC: 251 MG/DL (ref 74–99)

## 2024-09-02 PROCEDURE — 82947 ASSAY GLUCOSE BLOOD QUANT: CPT

## 2024-09-02 PROCEDURE — 2500000002 HC RX 250 W HCPCS SELF ADMINISTERED DRUGS (ALT 637 FOR MEDICARE OP, ALT 636 FOR OP/ED)

## 2024-09-02 PROCEDURE — 1100000001 HC PRIVATE ROOM DAILY

## 2024-09-02 PROCEDURE — 2500000004 HC RX 250 GENERAL PHARMACY W/ HCPCS (ALT 636 FOR OP/ED)

## 2024-09-02 ASSESSMENT — PAIN SCALES - WONG BAKER
WONGBAKER_NUMERICALRESPONSE: NO HURT
WONGBAKER_NUMERICALRESPONSE: NO HURT

## 2024-09-02 ASSESSMENT — COGNITIVE AND FUNCTIONAL STATUS - GENERAL
STANDING UP FROM CHAIR USING ARMS: A LOT
MOVING FROM LYING ON BACK TO SITTING ON SIDE OF FLAT BED WITH BEDRAILS: A LOT
EATING MEALS: A LITTLE
TURNING FROM BACK TO SIDE WHILE IN FLAT BAD: A LOT
WALKING IN HOSPITAL ROOM: A LOT
CLIMB 3 TO 5 STEPS WITH RAILING: TOTAL
DRESSING REGULAR LOWER BODY CLOTHING: A LOT
STANDING UP FROM CHAIR USING ARMS: A LOT
TOILETING: A LOT
TURNING FROM BACK TO SIDE WHILE IN FLAT BAD: A LOT
MOBILITY SCORE: 11
DRESSING REGULAR UPPER BODY CLOTHING: A LOT
MOVING TO AND FROM BED TO CHAIR: A LOT
MOVING TO AND FROM BED TO CHAIR: A LOT
PERSONAL GROOMING: A LITTLE
MOVING FROM LYING ON BACK TO SITTING ON SIDE OF FLAT BED WITH BEDRAILS: A LOT
MOBILITY SCORE: 11
DAILY ACTIVITIY SCORE: 14
CLIMB 3 TO 5 STEPS WITH RAILING: TOTAL
HELP NEEDED FOR BATHING: A LOT
WALKING IN HOSPITAL ROOM: A LOT

## 2024-09-02 ASSESSMENT — PAIN SCALES - GENERAL
PAINLEVEL_OUTOF10: 0 - NO PAIN
PAINLEVEL_OUTOF10: 2

## 2024-09-02 NOTE — ASSESSMENT & PLAN NOTE
1./Generalized weakness deconditioning/debility/confusion/poor p.o. intake//failure to thrive and worsening overall condition/CT scan of the brain without IV contrast on admission 8/29/2024 showed moderate age-related degenerative changes/moderate ventriculomegaly and atrophy and no infarcts or bleeds/patient is DNR cc/hospice and  and family have requested [facility] hospice.  Will await for hospice team to evaluate for placement.  Hospice consultation has been be obtained.   Aidan and family friend clearly understand her poor prognosis and do not want to pursue any further aggressive testing or prolong her suffering.  Patient is taking pleasure foods.  Her p.o. intake is improving.  2./Hx  of colon cancer with metastasis to lungs//CT scan of the chest and abdomen pelvis done in the ED  showed bilateral diffuse lung metastatic disease and no bowel obstruction.  Patient has been seen by oncology team/ and family friend have decided for [facility] hospice and no further aggressive treatments for her advanced colon CA.  Will await for hospice consultation and discharge to hospice facility when bed available.  Overall prognosis is poor.  #/Confusion/dementia/forgetfulness/confusion and no overt signs of infection.  Patient is pleasantly confused and at her baseline.  Vitals and labs noted.  Case discussed with the  who is present in the room.  Discharge to hospice facility when arrangements made.    -Pain medication as needed    -Dietitian recommendations       #5/ Diarrhea/has resolved.    #6/Electrolyte imbalance/hyponatremia/  Hypomagnesemia/sodium is 131/  -Improved and  does not want any further blood tests or medications.    /#8/chronic anemi    8/Hypoalbuminemia/poor p.o. intake/encourage p.o. food   -Dietitian consult and recommendations appreciated  -Wound preventION.  #9 history of hypertension/dyslipidemia/type 2 diabetes  -    10/DVT prophylaxis  -Lovenox  -SCDs

## 2024-09-02 NOTE — PROGRESS NOTES
Demetra Tran is a 74 y.o. female on day 4 of admission presenting with Hyponatremia.      Subjective   Patient seen and examined by me.  Patient is resting in bed.  Patient is comfortable and awake and alert and oriented x 2.  Her  is present in the room.  Her p.o. intake has been improving.  She is awaiting hospice consult for discharge to [facility].  Patient denies any pain.         Objective     Last Recorded Vitals  /58   Pulse 92   Temp 37.3 °C (99.1 °F)   Resp 18   Wt 89.6 kg (197 lb 8.5 oz)   SpO2 98%   Intake/Output last 3 Shifts:    Intake/Output Summary (Last 24 hours) at 9/2/2024 1633  Last data filed at 9/1/2024 1700  Gross per 24 hour   Intake 45 ml   Output --   Net 45 ml       Admission Weight  Weight: 89.6 kg (197 lb 8.5 oz) (08/29/24 1941)    Daily Weight  08/29/24 : 89.6 kg (197 lb 8.5 oz)    Image Results  ECG 12 lead  Sinus rhythm with 1st degree AV block with frequent Premature ventricular complexes  Left axis deviation  Low voltage QRS  Inferior infarct , age undetermined  Cannot rule out Anterior infarct , age undetermined  Abnormal ECG  When compared with ECG of 04-OCT-2021 11:29,  PREVIOUS ECG IS PRESENT  See ED provider note for full interpretation and clinical correlation  Confirmed by Denise Larose ([id number]) on 8/30/2024 2:55:28 PM  Lower extremity venous duplex bilateral             [place] [id number]  Tel [phone] and Fax [phone]       Vascular Lab Report  [facility] LOWER EXTREMITY VENOUS DUPLEX BILATERAL       Patient Name:      DEMETRA TRAN        Reading Physician:  [id number] Jennifer Maradiaga MD, RPVI  Study Date:        8/29/2024           Ordering Physician: [id number] JOCELIN VALENCIA  MRN/PID:           [id number]            Technologist:       Padma Kerr S  Accession#:        [id number]        Technologist 2:  Date of Birth/Age: 1950 / 74 years  Encounter#:         [id number]  Gender:            F  Admission Status:  Emergency           Location Performed: [facility]       Diagnosis/ICD: Localized (leg) edema-R60.0  Indication:    Limb edema  CPT Codes:     81493 Peripheral venous duplex scan for DVT complete       Patient History Cancer.       CONCLUSIONS:  Right popliteal fossa structure (details below); follow up suggested.    Right Lower Venous: No evidence of acute deep vein thrombus visualized in the right lower extremity. Additional Findings; Heterogeneous, non vascularized structure in the right pop fossa at 3.6cm x 1.4 cm. Findings could be consistent with a complex or rupturee cyst, however, clinical correlation and future follow-up recommended as its appearance is not entirely classical for a popliteal cyst.     Left Lower Venous: No evidence of acute deep vein thrombus visualized in the left lower extremity.     Imaging & Doppler Findings:     Right                 Compressible Thrombus        Flow  Distal External Iliac     Yes        None  CFV                       Yes        None   Spontaneous/Phasic  PFV                       Yes        None  FV Proximal               Yes        None   Spontaneous/Phasic  FV Mid                    Yes        None  FV Distal                 Yes        None  Popliteal                 Yes        None   Spontaneous/Phasic  Peroneal                  Yes        None  PTV                       Yes        None       Left                  Compress Thrombus        Flow  Distal External Iliac   Yes      None  CFV                     Yes      None   Spontaneous/Phasic  PFV                     Yes      None  FV Proximal             Yes      None   Spontaneous/Phasic  FV Mid                  Yes      None  FV Distal               Yes      None  Popliteal               Yes      None   Spontaneous/Phasic  Peroneal                Yes      None  PTV                     Yes      None       [id number] Jennifer Maradiaga MD,  RPVI  Electronically signed by [id number] Jennifer Maradiaga MD, RPVI on 8/30/2024 at 9:59:11 AM       ** Final **      Physical Exam  Vitals and nursing note reviewed.   Constitutional:       General: She is not in acute distress.     Appearance: Normal appearance. She is obese. She is not ill-appearing or toxic-appearing.   HENT:      Head: Normocephalic and atraumatic.      Right Ear: Tympanic membrane and ear canal normal. There is no impacted cerumen.      Left Ear: Tympanic membrane, ear canal and external ear normal.      Nose: Nose normal. No congestion or rhinorrhea.      Mouth/Throat:      Pharynx: Oropharynx is clear. No oropharyngeal exudate or posterior oropharyngeal erythema.   Eyes:      General: No scleral icterus.        Right eye: No discharge.         Left eye: No discharge.      Extraocular Movements: Extraocular movements intact.      Conjunctiva/sclera: Conjunctivae normal.      Pupils: Pupils are equal, round, and reactive to light.   Neck:      Vascular: No carotid bruit.   Cardiovascular:      Rate and Rhythm: Normal rate and regular rhythm.      Pulses: Normal pulses.      Heart sounds: Normal heart sounds. No murmur heard.     No gallop.   Pulmonary:      Effort: Pulmonary effort is normal. No respiratory distress.      Breath sounds: Normal breath sounds. No wheezing, rhonchi or rales.   Abdominal:      General: Abdomen is flat. Bowel sounds are normal. There is no distension.      Palpations: Abdomen is soft. There is no mass.      Tenderness: There is no abdominal tenderness. There is no right CVA tenderness, left CVA tenderness, guarding or rebound.      Hernia: No hernia is present.   Musculoskeletal:         General: No swelling or tenderness. Normal range of motion.      Cervical back: Normal range of motion and neck supple. No rigidity.      Right lower leg: No edema.      Left lower leg: No edema.   Lymphadenopathy:      Cervical: No cervical adenopathy.   Skin:     General: Skin is warm.       Coloration: Skin is not jaundiced.      Findings: No erythema or rash.   Neurological:      General: No focal deficit present.      Mental Status: She is alert. Mental status is at baseline.      Sensory: No sensory deficit.      Motor: No weakness.      Gait: Gait normal.      Deep Tendon Reflexes: Reflexes normal.      Comments: Baseline confused and awake alert oriented x 1-2 in no acute distress/not agitated   Psychiatric:         Mood and Affect: Mood normal.         Thought Content: Thought content normal.         Judgment: Judgment normal.       Relevant Results    Current Facility-Administered Medications:     acetaminophen (Tylenol) tablet 650 mg, 650 mg, oral, q4h PRN, Anabelle F Null, PA-C    dextrose 50 % injection 12.5 g, 12.5 g, intravenous, q15 min PRN, Anabelle F Null, PA-C    dextrose 50 % injection 25 g, 25 g, intravenous, q15 min PRN, Anabelle F Null, PA-C    enoxaparin (Lovenox) syringe 40 mg, 40 mg, subcutaneous, q24h, Anabelle F Null, PA-C, 40 mg at 09/01/24 2115    glucagon (Glucagen) injection 1 mg, 1 mg, intramuscular, q15 min PRN, Anabelle F Null, PA-C    glucagon (Glucagen) injection 1 mg, 1 mg, intramuscular, q15 min PRN, Anabelle F Null, PA-C    insulin lispro (HumaLOG) injection 0-5 Units, 0-5 Units, subcutaneous, TID, Anabelle F Null, PA-C, 1 Units at 09/02/24 1200    morphine injection 2 mg, 2 mg, intravenous, q4h PRN, Anabelle F Null, PA-C, 2 mg at 09/01/24 2320            Assessment/Plan                  Assessment & Plan  Hyponatremia    1./Generalized weakness deconditioning/debility/confusion/poor p.o. intake//failure to thrive and worsening overall condition/CT scan of the brain without IV contrast on admission 8/29/2024 showed moderate age-related degenerative changes/moderate ventriculomegaly and atrophy and no infarcts or bleeds/patient is DNR cc/hospice and  and family have requested [facility] hospice.  Will await for hospice team to evaluate for placement.  Hospice consultation has  been be obtained.   Aidan and family friend clearly understand her poor prognosis and do not want to pursue any further aggressive testing or prolong her suffering.  Patient is taking pleasure foods.  Her p.o. intake is improving.  2./Hx  of colon cancer with metastasis to lungs//CT scan of the chest and abdomen pelvis done in the ED  showed bilateral diffuse lung metastatic disease and no bowel obstruction.  Patient has been seen by oncology team/ and family friend have decided for [facility] hospice and no further aggressive treatments for her advanced colon CA.  Will await for hospice consultation and discharge to hospice facility when bed available.  Overall prognosis is poor.  #/Confusion/dementia/forgetfulness/confusion and no overt signs of infection.  Patient is pleasantly confused and at her baseline.  Vitals and labs noted.  Case discussed with the  who is present in the room.  Discharge to hospice facility when arrangements made.    -Pain medication as needed    -Dietitian recommendations       #5/ Diarrhea/has resolved.    #6/Electrolyte imbalance/hyponatremia/  Hypomagnesemia/sodium is 131/  -Improved and  does not want any further blood tests or medications.    /#8/chronic anemi    8/Hypoalbuminemia/poor p.o. intake/encourage p.o. food   -Dietitian consult and recommendations appreciated  -Wound preventION.  #9 history of hypertension/dyslipidemia/type 2 diabetes  -    10/DVT prophylaxis  -Lovenox  -SCDs    Medications reconciled.  Total time spent 20 minutes/time spent on patient interaction/counseling/assessment and plan and documentation.            Hernan Cuevas MD

## 2024-09-02 NOTE — CARE PLAN
The patient's goals for the shift include comfort and safety    The clinical goals for the shift include comfort and safety

## 2024-09-02 NOTE — CARE PLAN
The patient's goals for the shift include comfort and safety    The clinical goals for the shift include comfort

## 2024-09-03 LAB
GLUCOSE BLD MANUAL STRIP-MCNC: 158 MG/DL (ref 74–99)
GLUCOSE BLD MANUAL STRIP-MCNC: 175 MG/DL (ref 74–99)
GLUCOSE BLD MANUAL STRIP-MCNC: 274 MG/DL (ref 74–99)
GLUCOSE BLD MANUAL STRIP-MCNC: 307 MG/DL (ref 74–99)

## 2024-09-03 PROCEDURE — 82947 ASSAY GLUCOSE BLOOD QUANT: CPT

## 2024-09-03 PROCEDURE — 2500000002 HC RX 250 W HCPCS SELF ADMINISTERED DRUGS (ALT 637 FOR MEDICARE OP, ALT 636 FOR OP/ED)

## 2024-09-03 PROCEDURE — 1100000001 HC PRIVATE ROOM DAILY

## 2024-09-03 PROCEDURE — 99232 SBSQ HOSP IP/OBS MODERATE 35: CPT

## 2024-09-03 PROCEDURE — 2500000004 HC RX 250 GENERAL PHARMACY W/ HCPCS (ALT 636 FOR OP/ED)

## 2024-09-03 RX ORDER — ACETAMINOPHEN 325 MG/1
650 TABLET ORAL EVERY 4 HOURS PRN
Start: 2024-09-03

## 2024-09-03 RX ORDER — INSULIN LISPRO 100 [IU]/ML
4 INJECTION, SOLUTION INTRAVENOUS; SUBCUTANEOUS ONCE
Status: COMPLETED | OUTPATIENT
Start: 2024-09-03 | End: 2024-09-03

## 2024-09-03 ASSESSMENT — COGNITIVE AND FUNCTIONAL STATUS - GENERAL
DAILY ACTIVITIY SCORE: 14
EATING MEALS: A LITTLE
TURNING FROM BACK TO SIDE WHILE IN FLAT BAD: A LOT
MOBILITY SCORE: 11
PERSONAL GROOMING: A LITTLE
CLIMB 3 TO 5 STEPS WITH RAILING: TOTAL
MOVING TO AND FROM BED TO CHAIR: A LOT
WALKING IN HOSPITAL ROOM: A LOT
TOILETING: A LOT
HELP NEEDED FOR BATHING: A LOT
STANDING UP FROM CHAIR USING ARMS: A LOT
DRESSING REGULAR LOWER BODY CLOTHING: A LOT
MOVING FROM LYING ON BACK TO SITTING ON SIDE OF FLAT BED WITH BEDRAILS: A LOT
DRESSING REGULAR UPPER BODY CLOTHING: A LOT

## 2024-09-03 ASSESSMENT — PAIN SCALES - GENERAL
PAINLEVEL_OUTOF10: 0 - NO PAIN
PAINLEVEL_OUTOF10: 0 - NO PAIN

## 2024-09-03 NOTE — CARE PLAN
The patient's goals for the shift include comfort and safety    The clinical goals for the shift include comfort    Problem: Pain - Adult  Goal: Verbalizes/displays adequate comfort level or baseline comfort level  Outcome: Progressing     Problem: Safety - Adult  Goal: Free from fall injury  Outcome: Progressing     Problem: Discharge Planning  Goal: Discharge to home or other facility with appropriate resources  Outcome: Progressing     Problem: Chronic Conditions and Co-morbidities  Goal: Patient's chronic conditions and co-morbidity symptoms are monitored and maintained or improved  Outcome: Progressing     Problem: Skin  Goal: Participates in plan/prevention/treatment measures  Outcome: Progressing  Flowsheets (Taken 9/2/2024 2003)  Participates in plan/prevention/treatment measures: Elevate heels  Goal: Prevent/manage excess moisture  Outcome: Progressing  Flowsheets (Taken 9/2/2024 2003)  Prevent/manage excess moisture:   Moisturize dry skin   Cleanse incontinence/protect with barrier cream  Goal: Prevent/minimize sheer/friction injuries  Outcome: Progressing  Flowsheets (Taken 9/2/2024 2003)  Prevent/minimize sheer/friction injuries:   Turn/reposition every 2 hours/use positioning/transfer devices   Use pull sheet   HOB 30 degrees or less  Goal: Promote/optimize nutrition  Outcome: Progressing  Flowsheets (Taken 9/2/2024 2003)  Promote/optimize nutrition:   Offer water/supplements/favorite foods   Monitor/record intake including meals   Consume > 50% meals/supplements   Assist with feeding

## 2024-09-03 NOTE — PROGRESS NOTES
"Mehnaz Nunez is a 74 y.o. female on day 5 of admission presenting with Hyponatremia.    Subjective   Upon assessment of the patient this morning, the patient was sleeping and without obvious signs of distress.  She is awaiting placement to hospice facility.    Objective     Physical Exam  Constitutional:       General: She is not in acute distress.     Appearance: She is ill-appearing. She is not toxic-appearing or diaphoretic.      Comments: Sleeping, without restlessness, frail   HENT:      Head: Normocephalic and atraumatic.      Right Ear: External ear normal.      Left Ear: External ear normal.      Nose: Nose normal.      Mouth/Throat:      Mouth: Mucous membranes are moist.      Pharynx: Oropharynx is clear.   Eyes:      Pupils: Deferred due to sleeping.   Cardiovascular:      Rate and Rhythm: Normal rate and regular rhythm.      Pulses: Normal pulses.      Heart sounds: Normal heart sounds.   Pulmonary:      Effort: Pulmonary effort is normal. No respiratory distress.      Comments: Upper respiratory tract expiratory wheeze noted; bilateral lungs noted to be largely clear to auscultation  Abdominal:      General: Bowel sounds are normal. There is no distension.      Palpations: Abdomen is soft.   Musculoskeletal:      Cervical back: No rigidity.      Right lower leg: No edema.      Left lower leg: No edema.   Skin:     General: Skin is warm and dry.      Coloration: Skin is pale.   Neurological:      Comments: Sleeping..   Psychiatric:      Comments: Without restlessness.    Last Recorded Vitals  Blood pressure 129/62, pulse 78, temperature 36.8 °C (98.2 °F), temperature source Temporal, resp. rate 18, height 1.6 m (5' 2.99\"), weight 89.6 kg (197 lb 8.5 oz), SpO2 97%.  Intake/Output last 3 Shifts:  No intake/output data recorded.    Relevant Results  Scheduled medications  enoxaparin, 40 mg, subcutaneous, q24h  insulin lispro, 0-5 Units, subcutaneous, TID      Continuous medications     PRN medications  PRN " medications: acetaminophen, dextrose, dextrose, glucagon, glucagon, morphine    Results for orders placed or performed during the hospital encounter of 08/29/24 (from the past 24 hour(s))   POCT GLUCOSE   Result Value Ref Range    POCT Glucose 161 (H) 74 - 99 mg/dL   POCT GLUCOSE   Result Value Ref Range    POCT Glucose 227 (H) 74 - 99 mg/dL   POCT GLUCOSE   Result Value Ref Range    POCT Glucose 251 (H) 74 - 99 mg/dL   POCT GLUCOSE   Result Value Ref Range    POCT Glucose 158 (H) 74 - 99 mg/dL         Assessment/Plan   IMP:  Mehnaz Nunez is a 74 y.o. female with past medical history of colon cancer (diagnosed March 2021) with metastasis to the lungs status post chemo (last chemo August 2023, dementia, type 2 diabetes mellitus, hypertension, and hyperlipidemia,was admitted under the medicine service 8/29/2024 with hyponatremia.  The patient's  and best friend reported the patient has had decreased appetite and generally no food or water for the past 3 days as well as decreased movement and increased weakness.  Her baseline is ability to ambulate with a walker, but they stated she has been too weak to do so recently.  They also reported the patient has not been speaking much and the patient's  endorses the patient has had diarrhea for the past week as well as a low-grade fever; he stated she was treated with antibiotics for suspected bronchitis several weeks ago and did endorse that the patient has a dry cough.  Significant findings in the ED included hyponatremia 128, hypomagnesemia 1.39, anemia with hemoglobin 9.6, chest x-ray showing multiple bilateral large lung masses/nodules consistent with metastatic disease, and CT chest/abdomen/pelvis showing moderate thickening and nodularity of the distal rectosigmoid colon suggesting primary tumor as well as a moderate proximal stool burden.  The patient was treated with IV fluids as well as IV magnesium.  Family did report the patient has not been seeking  further oncologic treatment for her known cancer and the patient's  did change CODE STATUS to DNR CC.  Palliative care was consulted to discuss goals of care and advance care planning.     Recommendations  -Maintain current as needed morphine for symptoms of discomfort including tachypnea, moaning, restlessness, or facial grimacing  -Hospice consult is highly appropriate, and the patient is awaiting placement to hospice IPU  -The patient does not appear appropriate for GIP hospice admission at this time, but can be evaluated for the same if she has further clinical deterioration     8/30/2024-   The patient did not appear to be in any obvious distress, and so no additional medications will be recommended per our service at this time.  The patient was placed on as needed morphine, and I did adjust the medication to be used for verbal or nonverbal signs of discomfort, including tachypnea, moaning, restlessness, and facial grimacing.  The patient's renal function appears normal, and so morphine is quite safe in this instance for hospice services.  It does appear as though the patient was previously being treated for her metastatic disease, however this has not occurred for approximately 1 year, and with her underlying dementia, comfort measures are quite appropriate for the patient, and so we do also agree that hospice services would be highly appropriate for her.  The patient is currently resting comfortably, and so would likely be very appropriate for outpatient placement to a hospice facility, if approved by a hospice agency for the same.  I do not feel as though the patient is appropriate for [facility] hospice admission at this time, however, if she continues to have clinical deterioration, she can be reevaluated for potential [facility]IP hospice admission.  I will plan to speak with the patient's  today regarding plans to proceed with hospice care in the outpatient setting.  I will then coordinate with the  assigned hospital  for coordination of outpatient hospice services.  Palliative care will continue to follow.    9/3/2024-   The patient did not appear to be in any obvious distress, and her last bowel movement was earlier today, and so no additional medications will be recommended per our service at this time.  We do recommend continuing comfort measures which is in accordance with the patient's family's wishes for the patient.  She does appear quite comfortable and with symptoms well-managed, and so remains quite appropriate for placement to outpatient hospice facility.  We continue to support transition to hospice care in the outpatient setting.  Palliative care will continue to follow while the patient remains in the hospital.     Thank you for allowing us to participate in the care of this lady.  Should you have any further questions or concerns regarding her care, please do not hesitate to contact us via Haiku/Epic Chat (Kenzie Cheung during weekdays work hours and Franko Crow during weekdays after hours and over the weekend)     (This note was generated with voice recognition software and may contain errors including spelling, grammar, syntax and misrecognition of what was dictated, that are not fully corrected.)      Patient/proxy preference for information  Prefers full information    Goals of Care  The patient remains DNR CC CODE STATUS.  The patient's family are pursuing hospice placement.           I spent 20 minutes in the professional and overall care of this patient.      Kenzie Cheung, APRN-CNP

## 2024-09-03 NOTE — CARE PLAN
Problem: Pain - Adult  Goal: Verbalizes/displays adequate comfort level or baseline comfort level  Outcome: Progressing     Problem: Safety - Adult  Goal: Free from fall injury  Outcome: Progressing     Problem: Discharge Planning  Goal: Discharge to home or other facility with appropriate resources  Outcome: Progressing     Problem: Chronic Conditions and Co-morbidities  Goal: Patient's chronic conditions and co-morbidity symptoms are monitored and maintained or improved  Outcome: Progressing     Problem: Skin  Goal: Participates in plan/prevention/treatment measures  Outcome: Progressing  Flowsheets (Taken 9/3/2024 0858)  Participates in plan/prevention/treatment measures: Elevate heels  Goal: Prevent/manage excess moisture  Outcome: Progressing  Flowsheets (Taken 9/3/2024 0858)  Prevent/manage excess moisture:   Cleanse incontinence/protect with barrier cream   Monitor for/manage infection if present  Goal: Prevent/minimize sheer/friction injuries  Outcome: Progressing  Flowsheets (Taken 9/3/2024 0858)  Prevent/minimize sheer/friction injuries: Turn/reposition every 2 hours/use positioning/transfer devices  Goal: Promote/optimize nutrition  Outcome: Progressing  Flowsheets (Taken 9/3/2024 0858)  Promote/optimize nutrition: Offer water/supplements/favorite foods   The patient's goals for the shift include comfort and safety    The clinical goals for the shift include safety and comfort

## 2024-09-03 NOTE — PROGRESS NOTES
09/03/24 0842   Discharge Planning   Assistance Needed SW following for hospice placement. SW sent message to [facility] for update on plan for IPU placement. Waiting on response.     ADDED 1038: Received message from [facility] that they are able to accept pt at their IPU tomorrow and will set up transport for 10 am on 9/4.

## 2024-09-03 NOTE — PROGRESS NOTES
Mehnaz Nunez is a 74 y.o. female on day 5 of admission presenting with Hyponatremia.      Subjective   Patient seen examined this morning, no acute overnight events    Objective     Physical Exam    Pale, NAD  Head normocephalic, mucous membranes dry, anicteric sclera  Regular rate and rhythm  Coarse breath sounds auscultated bilaterally  Trace pitting edema noted over bilateral lower extremity  No focal deficits noted  Flat affect    Assessment/Plan   Mehnaz Nunez is a 74 y.o. female with a history of hypertension, hyperlipidemia, type 2 diabetes, dementia and colon cancer diagnosed on 3/21 with metastasis to the lung status post chemotherapy (reported last chemo in August 2023) presented to [facility] for generalized weakness.       #Suspect recurrence with the possibility of metastasis of colon cancer  -Patient has elected to have her CODE STATUS changed to DNR CC, I reconfirmed this CODE STATUS with her and her  at bedside.  At this time they are electing to go hospice and would not like any further oncological interventions as they feel that the side effects would not be worth it.  Palliative has been consulted and are providing appropriate treatment along with working with the patient for hospice placement.    This is a preliminary note written by the resident. Please wait for attending addendum for finalization of note and recommendations.    Riaz Sheehan DO, PGY-2  Internal Medicine

## 2024-09-04 VITALS
BODY MASS INDEX: 35 KG/M2 | OXYGEN SATURATION: 94 % | RESPIRATION RATE: 20 BRPM | SYSTOLIC BLOOD PRESSURE: 130 MMHG | DIASTOLIC BLOOD PRESSURE: 86 MMHG | TEMPERATURE: 98.4 F | HEIGHT: 63 IN | WEIGHT: 197.53 LBS | HEART RATE: 83 BPM

## 2024-09-04 LAB — GLUCOSE BLD MANUAL STRIP-MCNC: 157 MG/DL (ref 74–99)

## 2024-09-04 PROCEDURE — 2500000002 HC RX 250 W HCPCS SELF ADMINISTERED DRUGS (ALT 637 FOR MEDICARE OP, ALT 636 FOR OP/ED)

## 2024-09-04 PROCEDURE — 82947 ASSAY GLUCOSE BLOOD QUANT: CPT

## 2024-09-04 ASSESSMENT — PAIN SCALES - GENERAL: PAINLEVEL_OUTOF10: 0 - NO PAIN

## 2024-09-04 NOTE — DISCHARGE SUMMARY
Discharge Diagnosis  #1/Advanced metastatic colon cancer with mets to bilateral lungs/patient is DNR CC and hospice and  and family have decided no further chemo treatments.  2./Generalized deconditioning weakness and debility and poor p.o. intake  3./Forgetfulness and suspected underlying dementia  4./History of hypertension and dyslipidemia  5./History of type 2 diabetes  #6/hyponatremia hypomagnesemia and anemia of chronic disease/  7./ACP was discussed with  in length.  He does not want to pursue any aggressive treatments or further blood work or treatments or medication treatment.  He wants the patient to be comfortable and patient will be discharged to hospice when bed available which as per the  note will be tomorrow.    Issues Requiring Follow-Up  This is a 74 years old female with past medical history of colon cancer which was diagnosed 3/2021 with mets to the lungs bilaterally s/p chemo 8/2023/and  stopped the chemo as he stated his wife was getting very sick from the treatments and he and his family friend Jaci do not want to pursue any further chemo treatments or aggressive treatments.  He was managing the patient at home for the past 1 year or so and taking care of the patient.  Patient was brought into the emergency room when she had a poor p.o. intake generalized weakness deconditioning and deep with debility and not feeling well and confusion for 3 to 4 days duration.  In the ED evaluation patient was found to be hyponatremic and she was given IV fluids and her sodium somewhat improved.  She also had hypomagnesemia and this was supplemented.  Chest x-ray on admission showed multiple bilateral large lung masses and nodules consistent with metastatic disease and she had a CT of the chest abdomen and pelvis which showed moderate thickening and nodularity of the distal rectosigmoid colon suggesting primary tumor as well as moderate proximal stool burden/.  Palliative  care was consulted.  Oncology team was also consulted and they were agreeable for hospice care.   did not want to pursue any aggressive further chemo treatments.  Patient was made hospice and discharged to hospice in stable condition.  Patient denied any pain and her p.o. intake somewhat improved.  Overall prognosis is poor.  Condition was fully discussed with the patient's  Aidan during his visits in the room and family friend Jaci over the phone and they clearly understood her poor prognosis and they wanted to keep her comfortable.  Patient will be discharged to [facility] in AM.  Total time spent on patient interaction/counseling/assessment and plan and documentation and discussing plan of care with  and coordination of discharge planning 50 minutes.    Discharge Meds     Medication List      You have not been prescribed any medications.       Test Results Pending At Discharge  Pending Labs       No current pending labs.            Hospital Course   See above    Pertinent Physical Exam At Time of Discharge  Physical Exam  Vitals and nursing note reviewed.   Constitutional:       General: She is not in acute distress.     Appearance: Normal appearance. She is obese. She is not ill-appearing or toxic-appearing.   HENT:      Head: Normocephalic and atraumatic.      Right Ear: Tympanic membrane and ear canal normal. There is no impacted cerumen.      Left Ear: Tympanic membrane, ear canal and external ear normal.      Nose: Nose normal. No congestion or rhinorrhea.      Mouth/Throat:      Pharynx: Oropharynx is clear. No oropharyngeal exudate or posterior oropharyngeal erythema.   Eyes:      General: No scleral icterus.        Right eye: No discharge.         Left eye: No discharge.      Extraocular Movements: Extraocular movements intact.      Conjunctiva/sclera: Conjunctivae normal.      Pupils: Pupils are equal, round, and reactive to light.   Neck:      Vascular: No carotid bruit.    Cardiovascular:      Rate and Rhythm: Normal rate and regular rhythm.      Pulses: Normal pulses.      Heart sounds: Normal heart sounds. No murmur heard.     No gallop.   Pulmonary:      Effort: Pulmonary effort is normal. No respiratory distress.      Breath sounds: Normal breath sounds. No wheezing, rhonchi or rales.   Abdominal:      General: Abdomen is flat. Bowel sounds are normal. There is no distension.      Palpations: Abdomen is soft. There is no mass.      Tenderness: There is no abdominal tenderness. There is no right CVA tenderness, left CVA tenderness, guarding or rebound.      Hernia: No hernia is present.   Musculoskeletal:         General: No swelling or tenderness. Normal range of motion.      Cervical back: Normal range of motion and neck supple. No rigidity.      Right lower leg: No edema.      Left lower leg: No edema.   Lymphadenopathy:      Cervical: No cervical adenopathy.   Skin:     General: Skin is warm.      Coloration: Skin is not jaundiced.      Findings: No erythema or rash.   Neurological:      General: No focal deficit present.      Mental Status: She is alert and oriented to person, place, and time. Mental status is at baseline.      Sensory: No sensory deficit.      Gait: Gait normal.      Deep Tendon Reflexes: Reflexes normal.      Comments: Oriented to name and she knows she is in the hospital.  Speech is normal.  Patient has generalized weakness and debility and muscle weakness.  She is not able to ambulate.   Psychiatric:         Mood and Affect: Mood normal.         Thought Content: Thought content normal.         Judgment: Judgment normal.         Outpatient Follow-Up  No future appointments.      Hernan Cuevas MD

## 2024-09-04 NOTE — CARE PLAN
The patient's goals for the shift include comfort and safety    The clinical goals for the shift include Pt will remain safe and comfortable during the shift

## 2024-09-04 NOTE — CARE PLAN
Problem: Pain - Adult  Goal: Verbalizes/displays adequate comfort level or baseline comfort level  Outcome: Progressing     Problem: Safety - Adult  Goal: Free from fall injury  Outcome: Progressing     Problem: Discharge Planning  Goal: Discharge to home or other facility with appropriate resources  Outcome: Progressing     Problem: Chronic Conditions and Co-morbidities  Goal: Patient's chronic conditions and co-morbidity symptoms are monitored and maintained or improved  Outcome: Progressing     Problem: Skin  Goal: Participates in plan/prevention/treatment measures  Outcome: Progressing  Flowsheets (Taken 9/4/2024 1002)  Participates in plan/prevention/treatment measures: Elevate heels  Goal: Prevent/manage excess moisture  Outcome: Progressing  Flowsheets (Taken 9/4/2024 1002)  Prevent/manage excess moisture: Cleanse incontinence/protect with barrier cream  Goal: Prevent/minimize sheer/friction injuries  Outcome: Progressing  Flowsheets (Taken 9/4/2024 1002)  Prevent/minimize sheer/friction injuries: Turn/reposition every 2 hours/use positioning/transfer devices  Goal: Promote/optimize nutrition  Outcome: Progressing  Flowsheets (Taken 9/4/2024 1002)  Promote/optimize nutrition: Monitor/record intake including meals   The patient's goals for the shift include comfort and safety    The clinical goals for the shift include Pt will remain safe and comfortable during the shift